# Patient Record
Sex: MALE | Race: WHITE | Employment: OTHER
[De-identification: names, ages, dates, MRNs, and addresses within clinical notes are randomized per-mention and may not be internally consistent; named-entity substitution may affect disease eponyms.]

---

## 2017-02-20 ENCOUNTER — RX ONLY (RX ONLY)
Age: 72
End: 2017-02-20

## 2017-02-20 ENCOUNTER — DOCTOR'S OFFICE (OUTPATIENT)
Dept: URBAN - NONMETROPOLITAN AREA CLINIC 1 | Facility: CLINIC | Age: 72
Setting detail: OPHTHALMOLOGY
End: 2017-02-20
Payer: COMMERCIAL

## 2017-02-20 DIAGNOSIS — H04.121: ICD-10-CM

## 2017-02-20 DIAGNOSIS — H35.373: ICD-10-CM

## 2017-02-20 DIAGNOSIS — H35.3132: ICD-10-CM

## 2017-02-20 DIAGNOSIS — H04.122: ICD-10-CM

## 2017-02-20 DIAGNOSIS — H34.8320: ICD-10-CM

## 2017-02-20 PROCEDURE — 83861 MICROFLUID ANALY TEARS: CPT | Performed by: OPHTHALMOLOGY

## 2017-02-20 PROCEDURE — 92014 COMPRE OPH EXAM EST PT 1/>: CPT | Performed by: OPHTHALMOLOGY

## 2017-02-20 PROCEDURE — 92134 CPTRZ OPH DX IMG PST SGM RTA: CPT | Performed by: OPHTHALMOLOGY

## 2017-02-20 ASSESSMENT — REFRACTION_MANIFEST
OS_VA3: 20/
OS_VA2: 20/
OU_VA: 20/
OS_VA2: 20/
OS_VA1: 20/
OD_VA3: 20/
OU_VA: 20/
OS_VA3: 20/
OS_VA1: 20/
OD_VA3: 20/
OD_VA1: 20/
OS_VA1: 20/
OS_VA3: 20/
OS_VA2: 20/
OD_VA3: 20/
OU_VA: 20/
OD_VA2: 20/
OD_VA1: 20/
OD_VA1: 20/

## 2017-02-20 ASSESSMENT — REFRACTION_CURRENTRX
OS_OVR_VA: 20/
OD_OVR_VA: 20/
OS_OVR_VA: 20/
OD_OVR_VA: 20/
OD_OVR_VA: 20/
OS_OVR_VA: 20/

## 2017-02-20 ASSESSMENT — SPHEQUIV_DERIVED
OS_SPHEQUIV: 0.125
OD_SPHEQUIV: 3.125

## 2017-02-20 ASSESSMENT — REFRACTION_AUTOREFRACTION
OS_CYLINDER: -2.25
OD_SPHERE: +4.00
OS_AXIS: 83
OD_CYLINDER: -1.75
OS_SPHERE: +1.25
OD_AXIS: 94

## 2017-02-20 ASSESSMENT — CONFRONTATIONAL VISUAL FIELD TEST (CVF)
OS_FINDINGS: FULL
OD_FINDINGS: FULL

## 2017-02-20 ASSESSMENT — VISUAL ACUITY
OS_BCVA: 20/20-2
OD_BCVA: 20/400

## 2017-02-23 ENCOUNTER — DOCTOR'S OFFICE (OUTPATIENT)
Dept: URBAN - NONMETROPOLITAN AREA CLINIC 1 | Facility: CLINIC | Age: 72
Setting detail: OPHTHALMOLOGY
End: 2017-02-23
Payer: COMMERCIAL

## 2017-02-23 DIAGNOSIS — H35.3132: ICD-10-CM

## 2017-02-23 DIAGNOSIS — H04.122: ICD-10-CM

## 2017-02-23 PROCEDURE — 67028 INJECTION EYE DRUG: CPT | Performed by: OPHTHALMOLOGY

## 2017-02-23 ASSESSMENT — REFRACTION_AUTOREFRACTION
OS_AXIS: 83
OD_AXIS: 94
OS_SPHERE: +1.25
OD_CYLINDER: -1.75
OS_CYLINDER: -2.25
OD_SPHERE: +4.00

## 2017-02-23 ASSESSMENT — VISUAL ACUITY
OD_BCVA: 20/400
OS_BCVA: 20/20-2

## 2017-02-23 ASSESSMENT — SPHEQUIV_DERIVED
OD_SPHEQUIV: 3.125
OS_SPHEQUIV: 0.125

## 2017-03-23 ENCOUNTER — DOCTOR'S OFFICE (OUTPATIENT)
Dept: URBAN - NONMETROPOLITAN AREA CLINIC 1 | Facility: CLINIC | Age: 72
Setting detail: OPHTHALMOLOGY
End: 2017-03-23
Payer: COMMERCIAL

## 2017-03-23 DIAGNOSIS — H04.122: ICD-10-CM

## 2017-03-23 DIAGNOSIS — H04.121: ICD-10-CM

## 2017-03-23 DIAGNOSIS — H34.8320: ICD-10-CM

## 2017-03-23 DIAGNOSIS — H35.3132: ICD-10-CM

## 2017-03-23 PROCEDURE — 92014 COMPRE OPH EXAM EST PT 1/>: CPT | Performed by: OPHTHALMOLOGY

## 2017-03-23 PROCEDURE — 92134 CPTRZ OPH DX IMG PST SGM RTA: CPT | Performed by: OPHTHALMOLOGY

## 2017-03-23 ASSESSMENT — REFRACTION_MANIFEST
OS_VA2: 20/
OS_VA1: 20/
OS_VA3: 20/
OS_VA2: 20/
OD_VA1: 20/
OS_VA1: 20/
OU_VA: 20/
OS_VA3: 20/
OD_VA1: 20/
OD_VA2: 20/
OD_VA2: 20/
OS_VA1: 20/
OS_VA3: 20/
OU_VA: 20/
OD_VA3: 20/
OD_VA3: 20/
OS_VA2: 20/
OD_VA3: 20/
OD_VA1: 20/
OU_VA: 20/
OD_VA2: 20/

## 2017-03-23 ASSESSMENT — REFRACTION_AUTOREFRACTION
OD_AXIS: 94
OD_CYLINDER: -1.75
OS_AXIS: 83
OS_SPHERE: +1.25
OS_CYLINDER: -2.25
OD_SPHERE: +4.00

## 2017-03-23 ASSESSMENT — CONFRONTATIONAL VISUAL FIELD TEST (CVF)
OD_FINDINGS: FULL
OS_FINDINGS: INFERONASAL DEFECT

## 2017-03-23 ASSESSMENT — REFRACTION_CURRENTRX
OD_OVR_VA: 20/
OS_OVR_VA: 20/
OS_OVR_VA: 20/
OD_OVR_VA: 20/
OS_OVR_VA: 20/
OD_OVR_VA: 20/

## 2017-03-23 ASSESSMENT — VISUAL ACUITY
OD_BCVA: 20/400
OS_BCVA: 20/25-2

## 2017-03-23 ASSESSMENT — SPHEQUIV_DERIVED
OS_SPHEQUIV: 0.125
OD_SPHEQUIV: 3.125

## 2017-03-31 ENCOUNTER — DOCTOR'S OFFICE (OUTPATIENT)
Dept: URBAN - NONMETROPOLITAN AREA CLINIC 1 | Facility: CLINIC | Age: 72
Setting detail: OPHTHALMOLOGY
End: 2017-03-31
Payer: COMMERCIAL

## 2017-03-31 DIAGNOSIS — H34.8320: ICD-10-CM

## 2017-03-31 DIAGNOSIS — H35.373: ICD-10-CM

## 2017-03-31 DIAGNOSIS — H35.3132: ICD-10-CM

## 2017-03-31 PROCEDURE — 92235 FLUORESCEIN ANGRPH MLTIFRAME: CPT | Performed by: OPHTHALMOLOGY

## 2017-03-31 PROCEDURE — 92250 FUNDUS PHOTOGRAPHY W/I&R: CPT | Performed by: OPHTHALMOLOGY

## 2017-03-31 PROCEDURE — 67028 INJECTION EYE DRUG: CPT | Performed by: OPHTHALMOLOGY

## 2017-03-31 ASSESSMENT — VISUAL ACUITY
OS_BCVA: 20/25-2
OD_BCVA: 20/400

## 2017-03-31 ASSESSMENT — REFRACTION_AUTOREFRACTION
OS_SPHERE: +1.25
OS_AXIS: 83
OS_CYLINDER: -2.25
OD_SPHERE: +4.00
OD_AXIS: 94
OD_CYLINDER: -1.75

## 2017-03-31 ASSESSMENT — SPHEQUIV_DERIVED
OS_SPHEQUIV: 0.125
OD_SPHEQUIV: 3.125

## 2017-04-06 ENCOUNTER — RX ONLY (RX ONLY)
Age: 72
End: 2017-04-06

## 2017-04-06 ENCOUNTER — DOCTOR'S OFFICE (OUTPATIENT)
Dept: URBAN - NONMETROPOLITAN AREA CLINIC 1 | Facility: CLINIC | Age: 72
Setting detail: OPHTHALMOLOGY
End: 2017-04-06
Payer: COMMERCIAL

## 2017-04-06 DIAGNOSIS — H34.8320: ICD-10-CM

## 2017-04-06 PROCEDURE — 67210 TREATMENT OF RETINAL LESION: CPT | Performed by: OPHTHALMOLOGY

## 2017-04-06 ASSESSMENT — SPHEQUIV_DERIVED
OD_SPHEQUIV: 3.125
OS_SPHEQUIV: 0.125

## 2017-04-06 ASSESSMENT — VISUAL ACUITY
OS_BCVA: 20/25-2
OD_BCVA: 20/400

## 2017-04-06 ASSESSMENT — REFRACTION_AUTOREFRACTION
OS_SPHERE: +1.25
OD_CYLINDER: -1.75
OS_AXIS: 83
OD_AXIS: 94
OD_SPHERE: +4.00
OS_CYLINDER: -2.25

## 2017-06-22 ENCOUNTER — DOCTOR'S OFFICE (OUTPATIENT)
Dept: URBAN - NONMETROPOLITAN AREA CLINIC 1 | Facility: CLINIC | Age: 72
Setting detail: OPHTHALMOLOGY
End: 2017-06-22
Payer: COMMERCIAL

## 2017-06-22 DIAGNOSIS — H35.3132: ICD-10-CM

## 2017-06-22 DIAGNOSIS — H34.8320: ICD-10-CM

## 2017-06-22 DIAGNOSIS — H35.373: ICD-10-CM

## 2017-06-22 DIAGNOSIS — H25.13: ICD-10-CM

## 2017-06-22 DIAGNOSIS — H04.122: ICD-10-CM

## 2017-06-22 DIAGNOSIS — H04.121: ICD-10-CM

## 2017-06-22 PROCEDURE — 99024 POSTOP FOLLOW-UP VISIT: CPT | Performed by: OPHTHALMOLOGY

## 2017-06-22 PROCEDURE — 92134 CPTRZ OPH DX IMG PST SGM RTA: CPT | Performed by: OPHTHALMOLOGY

## 2017-06-22 ASSESSMENT — REFRACTION_MANIFEST
OD_VA2: 20/
OD_VA1: 20/
OS_VA3: 20/
OD_VA2: 20/
OS_VA1: 20/
OS_VA1: 20/
OS_VA2: 20/
OD_VA3: 20/
OS_VA3: 20/
OD_VA2: 20/
OS_VA2: 20/
OD_VA1: 20/
OS_VA2: 20/
OD_VA3: 20/
OD_VA1: 20/
OS_VA3: 20/
OU_VA: 20/
OS_VA1: 20/
OD_VA3: 20/
OU_VA: 20/
OU_VA: 20/

## 2017-06-22 ASSESSMENT — REFRACTION_CURRENTRX
OS_OVR_VA: 20/
OD_OVR_VA: 20/
OS_OVR_VA: 20/
OS_OVR_VA: 20/

## 2017-06-22 ASSESSMENT — REFRACTION_AUTOREFRACTION
OD_AXIS: 94
OD_CYLINDER: -1.75
OS_CYLINDER: -2.25
OS_SPHERE: +1.25
OS_AXIS: 83
OD_SPHERE: +4.00

## 2017-06-22 ASSESSMENT — SPHEQUIV_DERIVED
OD_SPHEQUIV: 3.125
OS_SPHEQUIV: 0.125

## 2017-06-22 ASSESSMENT — CONFRONTATIONAL VISUAL FIELD TEST (CVF)
OS_FINDINGS: INFERONASAL DEFECT
OD_FINDINGS: FULL

## 2017-06-22 ASSESSMENT — VISUAL ACUITY
OS_BCVA: 20/25+1
OD_BCVA: 20/400

## 2017-07-17 ENCOUNTER — DOCTOR'S OFFICE (OUTPATIENT)
Dept: URBAN - NONMETROPOLITAN AREA CLINIC 1 | Facility: CLINIC | Age: 72
Setting detail: OPHTHALMOLOGY
End: 2017-07-17
Payer: COMMERCIAL

## 2017-07-17 ENCOUNTER — RX ONLY (RX ONLY)
Age: 72
End: 2017-07-17

## 2017-07-17 DIAGNOSIS — H35.373: ICD-10-CM

## 2017-07-17 DIAGNOSIS — H35.3132: ICD-10-CM

## 2017-07-17 DIAGNOSIS — H04.121: ICD-10-CM

## 2017-07-17 DIAGNOSIS — H04.122: ICD-10-CM

## 2017-07-17 DIAGNOSIS — H25.13: ICD-10-CM

## 2017-07-17 DIAGNOSIS — H34.8320: ICD-10-CM

## 2017-07-17 PROCEDURE — 67210 TREATMENT OF RETINAL LESION: CPT | Performed by: OPHTHALMOLOGY

## 2017-07-17 PROCEDURE — 92014 COMPRE OPH EXAM EST PT 1/>: CPT | Performed by: OPHTHALMOLOGY

## 2017-07-17 PROCEDURE — 92235 FLUORESCEIN ANGRPH MLTIFRAME: CPT | Performed by: OPHTHALMOLOGY

## 2017-07-17 PROCEDURE — 92250 FUNDUS PHOTOGRAPHY W/I&R: CPT | Performed by: OPHTHALMOLOGY

## 2017-07-17 ASSESSMENT — REFRACTION_AUTOREFRACTION
OS_CYLINDER: -2.25
OD_CYLINDER: -1.75
OD_AXIS: 94
OS_AXIS: 83
OD_SPHERE: +4.00
OS_SPHERE: +1.25

## 2017-07-17 ASSESSMENT — REFRACTION_MANIFEST
OD_VA2: 20/
OD_VA1: 20/
OD_VA2: 20/
OS_VA1: 20/
OD_VA3: 20/
OD_VA2: 20/
OS_VA3: 20/
OS_VA3: 20/
OS_VA1: 20/
OS_VA2: 20/
OU_VA: 20/
OS_VA1: 20/
OS_VA3: 20/
OD_VA1: 20/
OS_VA2: 20/
OD_VA3: 20/
OD_VA3: 20/
OS_VA2: 20/
OU_VA: 20/
OD_VA1: 20/
OU_VA: 20/

## 2017-07-17 ASSESSMENT — CONFRONTATIONAL VISUAL FIELD TEST (CVF)
OS_FINDINGS: INFERONASAL DEFECT
OD_FINDINGS: FULL

## 2017-07-17 ASSESSMENT — REFRACTION_CURRENTRX
OS_OVR_VA: 20/
OD_OVR_VA: 20/
OS_OVR_VA: 20/
OS_OVR_VA: 20/
OD_OVR_VA: 20/
OD_OVR_VA: 20/

## 2017-07-17 ASSESSMENT — SPHEQUIV_DERIVED
OD_SPHEQUIV: 3.125
OS_SPHEQUIV: 0.125

## 2017-07-17 ASSESSMENT — VISUAL ACUITY
OD_BCVA: 20/400
OS_BCVA: 20/20

## 2017-07-24 ENCOUNTER — DOCTOR'S OFFICE (OUTPATIENT)
Dept: URBAN - NONMETROPOLITAN AREA CLINIC 1 | Facility: CLINIC | Age: 72
Setting detail: OPHTHALMOLOGY
End: 2017-07-24
Payer: COMMERCIAL

## 2017-07-24 DIAGNOSIS — H34.8320: ICD-10-CM

## 2017-07-24 PROCEDURE — 67028 INJECTION EYE DRUG: CPT | Performed by: OPHTHALMOLOGY

## 2017-07-24 PROCEDURE — 99024 POSTOP FOLLOW-UP VISIT: CPT | Performed by: OPHTHALMOLOGY

## 2017-07-24 ASSESSMENT — SPHEQUIV_DERIVED
OD_SPHEQUIV: 3.125
OS_SPHEQUIV: 0.125

## 2017-07-24 ASSESSMENT — REFRACTION_AUTOREFRACTION
OD_AXIS: 94
OS_SPHERE: +1.25
OS_AXIS: 83
OD_SPHERE: +4.00
OS_CYLINDER: -2.25
OD_CYLINDER: -1.75

## 2017-07-24 ASSESSMENT — VISUAL ACUITY
OS_BCVA: 20/20
OD_BCVA: 20/400

## 2017-09-25 ENCOUNTER — DOCTOR'S OFFICE (OUTPATIENT)
Dept: URBAN - NONMETROPOLITAN AREA CLINIC 1 | Facility: CLINIC | Age: 72
Setting detail: OPHTHALMOLOGY
End: 2017-09-25
Payer: COMMERCIAL

## 2017-09-25 DIAGNOSIS — H35.3132: ICD-10-CM

## 2017-09-25 DIAGNOSIS — H25.13: ICD-10-CM

## 2017-09-25 DIAGNOSIS — H04.121: ICD-10-CM

## 2017-09-25 DIAGNOSIS — H04.122: ICD-10-CM

## 2017-09-25 DIAGNOSIS — H34.8320: ICD-10-CM

## 2017-09-25 DIAGNOSIS — H35.373: ICD-10-CM

## 2017-09-25 PROCEDURE — 99024 POSTOP FOLLOW-UP VISIT: CPT | Performed by: OPHTHALMOLOGY

## 2017-09-25 PROCEDURE — 92134 CPTRZ OPH DX IMG PST SGM RTA: CPT | Performed by: OPHTHALMOLOGY

## 2017-09-25 ASSESSMENT — REFRACTION_MANIFEST
OD_VA3: 20/
OS_VA2: 20/
OD_VA2: 20/
OU_VA: 20/
OD_VA2: 20/
OU_VA: 20/
OS_VA2: 20/
OS_VA1: 20/
OU_VA: 20/
OD_VA3: 20/
OS_VA1: 20/
OS_VA3: 20/
OS_VA3: 20/
OD_VA3: 20/
OS_VA3: 20/
OD_VA1: 20/
OS_VA1: 20/
OD_VA1: 20/
OS_VA2: 20/
OD_VA2: 20/
OD_VA1: 20/

## 2017-09-25 ASSESSMENT — REFRACTION_CURRENTRX
OS_OVR_VA: 20/
OD_OVR_VA: 20/

## 2017-09-25 ASSESSMENT — REFRACTION_AUTOREFRACTION
OD_SPHERE: +4.00
OS_CYLINDER: -2.25
OD_AXIS: 94
OS_SPHERE: +1.25
OS_AXIS: 83
OD_CYLINDER: -1.75

## 2017-09-25 ASSESSMENT — CONFRONTATIONAL VISUAL FIELD TEST (CVF)
OD_FINDINGS: FULL
OS_FINDINGS: FULL

## 2017-09-25 ASSESSMENT — VISUAL ACUITY
OD_BCVA: 20/400
OS_BCVA: 20/20-2

## 2017-09-25 ASSESSMENT — SPHEQUIV_DERIVED
OS_SPHEQUIV: 0.125
OD_SPHEQUIV: 3.125

## 2017-10-06 ENCOUNTER — DOCTOR'S OFFICE (OUTPATIENT)
Dept: URBAN - NONMETROPOLITAN AREA CLINIC 1 | Facility: CLINIC | Age: 72
Setting detail: OPHTHALMOLOGY
End: 2017-10-06
Payer: COMMERCIAL

## 2017-10-06 DIAGNOSIS — H35.3132: ICD-10-CM

## 2017-10-06 DIAGNOSIS — H34.8320: ICD-10-CM

## 2017-10-06 PROCEDURE — 67028 INJECTION EYE DRUG: CPT | Performed by: OPHTHALMOLOGY

## 2017-10-06 ASSESSMENT — REFRACTION_AUTOREFRACTION
OS_AXIS: 83
OS_SPHERE: +1.25
OD_CYLINDER: -1.75
OD_SPHERE: +4.00
OS_CYLINDER: -2.25
OD_AXIS: 94

## 2017-10-06 ASSESSMENT — SPHEQUIV_DERIVED
OS_SPHEQUIV: 0.125
OD_SPHEQUIV: 3.125

## 2017-10-06 ASSESSMENT — VISUAL ACUITY
OS_BCVA: 20/20-2
OD_BCVA: 20/400

## 2017-10-12 ENCOUNTER — DOCTOR'S OFFICE (OUTPATIENT)
Dept: URBAN - NONMETROPOLITAN AREA CLINIC 1 | Facility: CLINIC | Age: 72
Setting detail: OPHTHALMOLOGY
End: 2017-10-12
Payer: COMMERCIAL

## 2017-10-12 DIAGNOSIS — H35.3132: ICD-10-CM

## 2017-10-12 DIAGNOSIS — H34.8320: ICD-10-CM

## 2017-10-12 DIAGNOSIS — H00.14: ICD-10-CM

## 2017-10-12 PROCEDURE — NO CHARGE N/C PROFESSIONAL COURTESY: Performed by: OPHTHALMOLOGY

## 2017-10-12 ASSESSMENT — REFRACTION_MANIFEST
OS_VA1: 20/
OD_VA1: 20/
OU_VA: 20/
OS_VA1: 20/
OD_VA1: 20/
OS_VA2: 20/
OD_VA2: 20/
OS_VA3: 20/
OU_VA: 20/
OS_VA1: 20/
OS_VA3: 20/
OS_VA3: 20/
OD_VA2: 20/
OD_VA3: 20/
OU_VA: 20/
OS_VA2: 20/
OD_VA2: 20/
OD_VA3: 20/
OD_VA1: 20/
OS_VA2: 20/
OD_VA3: 20/

## 2017-10-12 ASSESSMENT — REFRACTION_CURRENTRX
OD_OVR_VA: 20/
OS_OVR_VA: 20/
OD_OVR_VA: 20/
OS_OVR_VA: 20/
OD_OVR_VA: 20/
OS_OVR_VA: 20/

## 2017-10-12 ASSESSMENT — REFRACTION_AUTOREFRACTION
OD_SPHERE: +4.00
OD_CYLINDER: -1.75
OS_AXIS: 83
OS_SPHERE: +1.25
OS_CYLINDER: -2.25
OD_AXIS: 94

## 2017-10-12 ASSESSMENT — SPHEQUIV_DERIVED
OD_SPHEQUIV: 3.125
OS_SPHEQUIV: 0.125

## 2017-10-12 ASSESSMENT — VISUAL ACUITY
OS_BCVA: 20/20-2
OD_BCVA: 20/400

## 2017-10-16 ENCOUNTER — DOCTOR'S OFFICE (OUTPATIENT)
Dept: URBAN - NONMETROPOLITAN AREA CLINIC 1 | Facility: CLINIC | Age: 72
Setting detail: OPHTHALMOLOGY
End: 2017-10-16
Payer: COMMERCIAL

## 2017-10-16 ENCOUNTER — RX ONLY (RX ONLY)
Age: 72
End: 2017-10-16

## 2017-10-16 DIAGNOSIS — H34.8320: ICD-10-CM

## 2017-10-16 PROCEDURE — 67028 INJECTION EYE DRUG: CPT | Performed by: OPHTHALMOLOGY

## 2017-10-16 ASSESSMENT — SPHEQUIV_DERIVED
OD_SPHEQUIV: 3.125
OS_SPHEQUIV: 0.125

## 2017-10-16 ASSESSMENT — REFRACTION_AUTOREFRACTION
OS_CYLINDER: -2.25
OD_CYLINDER: -1.75
OD_SPHERE: +4.00
OS_SPHERE: +1.25
OS_AXIS: 83
OD_AXIS: 94

## 2017-10-16 ASSESSMENT — VISUAL ACUITY
OD_BCVA: 20/400
OS_BCVA: 20/20-2

## 2017-10-20 ENCOUNTER — DOCTOR'S OFFICE (OUTPATIENT)
Dept: URBAN - NONMETROPOLITAN AREA CLINIC 1 | Facility: CLINIC | Age: 72
Setting detail: OPHTHALMOLOGY
End: 2017-10-20
Payer: COMMERCIAL

## 2017-10-20 DIAGNOSIS — H34.8320: ICD-10-CM

## 2017-10-20 PROCEDURE — 67210 TREATMENT OF RETINAL LESION: CPT | Performed by: OPHTHALMOLOGY

## 2017-10-20 ASSESSMENT — VISUAL ACUITY
OD_BCVA: 20/400
OS_BCVA: 20/20-2

## 2017-10-20 ASSESSMENT — SPHEQUIV_DERIVED
OD_SPHEQUIV: 3.125
OS_SPHEQUIV: 0.125

## 2017-10-20 ASSESSMENT — REFRACTION_AUTOREFRACTION
OS_AXIS: 83
OD_SPHERE: +4.00
OD_AXIS: 94
OS_CYLINDER: -2.25
OS_SPHERE: +1.25
OD_CYLINDER: -1.75

## 2017-11-13 ENCOUNTER — DOCTOR'S OFFICE (OUTPATIENT)
Dept: URBAN - NONMETROPOLITAN AREA CLINIC 1 | Facility: CLINIC | Age: 72
Setting detail: OPHTHALMOLOGY
End: 2017-11-13
Payer: COMMERCIAL

## 2017-11-13 DIAGNOSIS — H34.8320: ICD-10-CM

## 2017-11-13 DIAGNOSIS — H25.13: ICD-10-CM

## 2017-11-13 DIAGNOSIS — H04.123: ICD-10-CM

## 2017-11-13 DIAGNOSIS — H35.3132: ICD-10-CM

## 2017-11-13 DIAGNOSIS — H35.373: ICD-10-CM

## 2017-11-13 DIAGNOSIS — H00.14: ICD-10-CM

## 2017-11-13 PROCEDURE — 67028 INJECTION EYE DRUG: CPT | Performed by: OPHTHALMOLOGY

## 2017-11-13 PROCEDURE — 92134 CPTRZ OPH DX IMG PST SGM RTA: CPT | Performed by: OPHTHALMOLOGY

## 2017-11-13 PROCEDURE — 92014 COMPRE OPH EXAM EST PT 1/>: CPT | Performed by: OPHTHALMOLOGY

## 2017-11-13 ASSESSMENT — SPHEQUIV_DERIVED
OD_SPHEQUIV: 3.125
OS_SPHEQUIV: 0.125

## 2017-11-13 ASSESSMENT — VISUAL ACUITY
OD_BCVA: 20/400
OS_BCVA: 20/25-2

## 2017-11-13 ASSESSMENT — REFRACTION_MANIFEST
OS_VA2: 20/
OD_VA3: 20/
OS_VA2: 20/
OD_VA1: 20/
OD_VA2: 20/
OD_VA2: 20/
OS_VA2: 20/
OS_VA1: 20/
OD_VA3: 20/
OD_VA1: 20/
OS_VA1: 20/
OU_VA: 20/
OS_VA3: 20/
OU_VA: 20/
OS_VA1: 20/
OS_VA3: 20/
OD_VA1: 20/
OU_VA: 20/
OD_VA3: 20/
OS_VA3: 20/
OD_VA2: 20/

## 2017-11-13 ASSESSMENT — REFRACTION_CURRENTRX
OD_OVR_VA: 20/
OS_OVR_VA: 20/
OD_OVR_VA: 20/
OD_OVR_VA: 20/
OS_OVR_VA: 20/
OS_OVR_VA: 20/

## 2017-11-13 ASSESSMENT — REFRACTION_AUTOREFRACTION
OS_CYLINDER: -2.25
OD_AXIS: 94
OD_CYLINDER: -1.75
OS_AXIS: 83
OD_SPHERE: +4.00
OS_SPHERE: +1.25

## 2017-11-13 ASSESSMENT — CONFRONTATIONAL VISUAL FIELD TEST (CVF)
OS_FINDINGS: FULL
OD_FINDINGS: FULL

## 2017-12-01 ENCOUNTER — DOCTOR'S OFFICE (OUTPATIENT)
Dept: URBAN - NONMETROPOLITAN AREA CLINIC 1 | Facility: CLINIC | Age: 72
Setting detail: OPHTHALMOLOGY
End: 2017-12-01
Payer: COMMERCIAL

## 2017-12-01 DIAGNOSIS — H34.8320: ICD-10-CM

## 2017-12-01 PROCEDURE — 99024 POSTOP FOLLOW-UP VISIT: CPT | Performed by: OPHTHALMOLOGY

## 2017-12-01 PROCEDURE — 67228 TREATMENT X10SV RETINOPATHY: CPT | Performed by: OPHTHALMOLOGY

## 2017-12-01 ASSESSMENT — REFRACTION_AUTOREFRACTION
OD_AXIS: 94
OD_CYLINDER: -1.75
OS_AXIS: 83
OS_SPHERE: +1.25
OS_CYLINDER: -2.25
OD_SPHERE: +4.00

## 2017-12-01 ASSESSMENT — VISUAL ACUITY
OD_BCVA: 20/400
OS_BCVA: 20/25-2

## 2017-12-01 ASSESSMENT — SPHEQUIV_DERIVED
OD_SPHEQUIV: 3.125
OS_SPHEQUIV: 0.125

## 2017-12-15 ENCOUNTER — DOCTOR'S OFFICE (OUTPATIENT)
Dept: URBAN - NONMETROPOLITAN AREA CLINIC 1 | Facility: CLINIC | Age: 72
Setting detail: OPHTHALMOLOGY
End: 2017-12-15
Payer: COMMERCIAL

## 2017-12-15 DIAGNOSIS — H34.8320: ICD-10-CM

## 2017-12-15 PROCEDURE — 92235 FLUORESCEIN ANGRPH MLTIFRAME: CPT | Performed by: OPHTHALMOLOGY

## 2017-12-15 PROCEDURE — 67028 INJECTION EYE DRUG: CPT | Performed by: OPHTHALMOLOGY

## 2017-12-15 PROCEDURE — 99024 POSTOP FOLLOW-UP VISIT: CPT | Performed by: OPHTHALMOLOGY

## 2017-12-15 PROCEDURE — 92250 FUNDUS PHOTOGRAPHY W/I&R: CPT | Performed by: OPHTHALMOLOGY

## 2017-12-15 ASSESSMENT — SPHEQUIV_DERIVED
OD_SPHEQUIV: 3.125
OS_SPHEQUIV: 0.125

## 2017-12-15 ASSESSMENT — REFRACTION_AUTOREFRACTION
OS_SPHERE: +1.25
OS_CYLINDER: -2.25
OD_CYLINDER: -1.75
OS_AXIS: 83
OD_AXIS: 94
OD_SPHERE: +4.00

## 2017-12-15 ASSESSMENT — VISUAL ACUITY
OD_BCVA: 20/400
OS_BCVA: 20/25-2

## 2018-03-12 ENCOUNTER — DOCTOR'S OFFICE (OUTPATIENT)
Dept: URBAN - NONMETROPOLITAN AREA CLINIC 1 | Facility: CLINIC | Age: 73
Setting detail: OPHTHALMOLOGY
End: 2018-03-12
Payer: COMMERCIAL

## 2018-03-12 DIAGNOSIS — H35.3132: ICD-10-CM

## 2018-03-12 DIAGNOSIS — H25.13: ICD-10-CM

## 2018-03-12 DIAGNOSIS — H35.373: ICD-10-CM

## 2018-03-12 DIAGNOSIS — H43.813: ICD-10-CM

## 2018-03-12 DIAGNOSIS — H43.812: ICD-10-CM

## 2018-03-12 DIAGNOSIS — H04.123: ICD-10-CM

## 2018-03-12 DIAGNOSIS — H43.811: ICD-10-CM

## 2018-03-12 PROCEDURE — 92134 CPTRZ OPH DX IMG PST SGM RTA: CPT | Performed by: OPHTHALMOLOGY

## 2018-03-12 PROCEDURE — 92014 COMPRE OPH EXAM EST PT 1/>: CPT | Performed by: OPHTHALMOLOGY

## 2018-03-12 PROCEDURE — 92225 OPHTHALMOSCOPY EXTENDED INITIAL: CPT | Performed by: OPHTHALMOLOGY

## 2018-03-12 ASSESSMENT — REFRACTION_MANIFEST
OS_VA1: 20/
OD_VA2: 20/
OS_VA3: 20/
OS_VA3: 20/
OS_VA2: 20/
OU_VA: 20/
OD_VA3: 20/
OD_VA3: 20/
OU_VA: 20/
OS_VA2: 20/
OD_VA2: 20/
OD_VA1: 20/
OD_VA1: 20/
OD_VA3: 20/
OS_VA2: 20/
OS_VA1: 20/
OU_VA: 20/
OS_VA1: 20/
OD_VA2: 20/
OD_VA1: 20/
OS_VA3: 20/

## 2018-03-12 ASSESSMENT — SPHEQUIV_DERIVED
OS_SPHEQUIV: 0.125
OD_SPHEQUIV: 3.125

## 2018-03-12 ASSESSMENT — REFRACTION_AUTOREFRACTION
OD_AXIS: 94
OD_SPHERE: +4.00
OD_CYLINDER: -1.75
OS_AXIS: 83
OS_SPHERE: +1.25
OS_CYLINDER: -2.25

## 2018-03-12 ASSESSMENT — REFRACTION_CURRENTRX
OS_OVR_VA: 20/
OS_OVR_VA: 20/
OD_OVR_VA: 20/
OD_OVR_VA: 20/
OS_OVR_VA: 20/
OD_OVR_VA: 20/

## 2018-03-12 ASSESSMENT — CONFRONTATIONAL VISUAL FIELD TEST (CVF)
OD_FINDINGS: FULL
OS_FINDINGS: FULL

## 2018-03-12 ASSESSMENT — VISUAL ACUITY
OD_BCVA: CF 5FT
OS_BCVA: 20/25-2

## 2018-03-22 ENCOUNTER — DOCTOR'S OFFICE (OUTPATIENT)
Dept: URBAN - NONMETROPOLITAN AREA CLINIC 1 | Facility: CLINIC | Age: 73
Setting detail: OPHTHALMOLOGY
End: 2018-03-22
Payer: COMMERCIAL

## 2018-03-22 DIAGNOSIS — H34.8120: ICD-10-CM

## 2018-03-22 PROCEDURE — 67028 INJECTION EYE DRUG: CPT | Performed by: OPHTHALMOLOGY

## 2018-03-22 ASSESSMENT — REFRACTION_AUTOREFRACTION
OD_CYLINDER: -1.75
OS_SPHERE: +1.25
OD_SPHERE: +4.00
OS_AXIS: 83
OS_CYLINDER: -2.25
OD_AXIS: 94

## 2018-03-22 ASSESSMENT — SPHEQUIV_DERIVED
OS_SPHEQUIV: 0.125
OD_SPHEQUIV: 3.125

## 2018-03-22 ASSESSMENT — VISUAL ACUITY
OS_BCVA: 20/25-2
OD_BCVA: CF 5FT

## 2018-03-29 ENCOUNTER — DOCTOR'S OFFICE (OUTPATIENT)
Dept: URBAN - NONMETROPOLITAN AREA CLINIC 1 | Facility: CLINIC | Age: 73
Setting detail: OPHTHALMOLOGY
End: 2018-03-29
Payer: COMMERCIAL

## 2018-03-29 DIAGNOSIS — H34.8120: ICD-10-CM

## 2018-03-29 PROCEDURE — 67210 TREATMENT OF RETINAL LESION: CPT | Performed by: OPHTHALMOLOGY

## 2018-03-29 ASSESSMENT — VISUAL ACUITY
OS_BCVA: 20/25-2
OD_BCVA: CF 5FT

## 2018-03-29 ASSESSMENT — REFRACTION_AUTOREFRACTION
OD_SPHERE: +4.00
OS_CYLINDER: -2.25
OD_AXIS: 94
OS_AXIS: 83
OD_CYLINDER: -1.75
OS_SPHERE: +1.25

## 2018-03-29 ASSESSMENT — SPHEQUIV_DERIVED
OS_SPHEQUIV: 0.125
OD_SPHEQUIV: 3.125

## 2018-04-16 ENCOUNTER — DOCTOR'S OFFICE (OUTPATIENT)
Dept: URBAN - NONMETROPOLITAN AREA CLINIC 1 | Facility: CLINIC | Age: 73
Setting detail: OPHTHALMOLOGY
End: 2018-04-16
Payer: COMMERCIAL

## 2018-04-16 DIAGNOSIS — H43.812: ICD-10-CM

## 2018-04-16 DIAGNOSIS — H35.373: ICD-10-CM

## 2018-04-16 DIAGNOSIS — H25.13: ICD-10-CM

## 2018-04-16 DIAGNOSIS — H43.811: ICD-10-CM

## 2018-04-16 DIAGNOSIS — H04.121: ICD-10-CM

## 2018-04-16 DIAGNOSIS — H35.3132: ICD-10-CM

## 2018-04-16 DIAGNOSIS — H34.8120: ICD-10-CM

## 2018-04-16 DIAGNOSIS — H40.053: ICD-10-CM

## 2018-04-16 DIAGNOSIS — H04.122: ICD-10-CM

## 2018-04-16 PROCEDURE — 99024 POSTOP FOLLOW-UP VISIT: CPT | Performed by: OPHTHALMOLOGY

## 2018-04-16 PROCEDURE — 92226 OPHTHALMOSCOPY EXT SUBSEQUENT: CPT | Performed by: OPHTHALMOLOGY

## 2018-04-16 PROCEDURE — 92134 CPTRZ OPH DX IMG PST SGM RTA: CPT | Performed by: OPHTHALMOLOGY

## 2018-04-16 ASSESSMENT — REFRACTION_CURRENTRX
OS_OVR_VA: 20/
OS_OVR_VA: 20/
OD_OVR_VA: 20/
OS_OVR_VA: 20/
OD_OVR_VA: 20/
OD_OVR_VA: 20/

## 2018-04-16 ASSESSMENT — REFRACTION_AUTOREFRACTION
OS_CYLINDER: -2.25
OD_CYLINDER: -1.75
OD_AXIS: 94
OD_SPHERE: +4.00
OS_AXIS: 83
OS_SPHERE: +1.25

## 2018-04-16 ASSESSMENT — REFRACTION_MANIFEST
OD_VA3: 20/
OS_VA3: 20/
OD_VA1: 20/
OD_VA2: 20/
OS_VA2: 20/
OD_VA1: 20/
OU_VA: 20/
OD_VA2: 20/
OS_VA3: 20/
OU_VA: 20/
OD_VA2: 20/
OU_VA: 20/
OS_VA2: 20/
OS_VA1: 20/
OS_VA1: 20/
OS_VA2: 20/
OD_VA3: 20/
OS_VA3: 20/
OD_VA1: 20/
OD_VA3: 20/
OS_VA1: 20/

## 2018-04-16 ASSESSMENT — VISUAL ACUITY
OS_BCVA: 20/20-1
OD_BCVA: CF 5FT

## 2018-04-16 ASSESSMENT — SPHEQUIV_DERIVED
OD_SPHEQUIV: 3.125
OS_SPHEQUIV: 0.125

## 2018-04-16 ASSESSMENT — CONFRONTATIONAL VISUAL FIELD TEST (CVF)
OD_FINDINGS: FULL
OS_FINDINGS: FULL

## 2018-04-26 ENCOUNTER — DOCTOR'S OFFICE (OUTPATIENT)
Dept: URBAN - NONMETROPOLITAN AREA CLINIC 1 | Facility: CLINIC | Age: 73
Setting detail: OPHTHALMOLOGY
End: 2018-04-26
Payer: COMMERCIAL

## 2018-04-26 DIAGNOSIS — H34.8120: ICD-10-CM

## 2018-04-26 PROCEDURE — 92250 FUNDUS PHOTOGRAPHY W/I&R: CPT | Performed by: OPHTHALMOLOGY

## 2018-04-26 PROCEDURE — 67028 INJECTION EYE DRUG: CPT | Performed by: OPHTHALMOLOGY

## 2018-04-26 PROCEDURE — 99024 POSTOP FOLLOW-UP VISIT: CPT | Performed by: OPHTHALMOLOGY

## 2018-04-26 PROCEDURE — 92235 FLUORESCEIN ANGRPH MLTIFRAME: CPT | Performed by: OPHTHALMOLOGY

## 2018-04-26 ASSESSMENT — VISUAL ACUITY
OS_BCVA: 20/20-1
OD_BCVA: CF 5FT

## 2018-04-26 ASSESSMENT — REFRACTION_MANIFEST
OU_VA: 20/
OS_VA3: 20/
OS_VA1: 20/
OS_VA2: 20/
OS_VA1: 20/
OD_VA1: 20/
OD_VA3: 20/
OU_VA: 20/
OS_VA3: 20/
OD_VA3: 20/
OD_VA2: 20/
OS_VA3: 20/
OD_VA1: 20/
OD_VA1: 20/
OU_VA: 20/
OS_VA2: 20/
OD_VA3: 20/
OS_VA2: 20/
OD_VA2: 20/
OD_VA2: 20/
OS_VA1: 20/

## 2018-04-26 ASSESSMENT — REFRACTION_AUTOREFRACTION
OD_CYLINDER: -1.75
OD_SPHERE: +4.00
OS_CYLINDER: -2.25
OD_AXIS: 94
OS_SPHERE: +1.25
OS_AXIS: 83

## 2018-04-26 ASSESSMENT — REFRACTION_CURRENTRX
OD_OVR_VA: 20/
OD_OVR_VA: 20/
OS_OVR_VA: 20/
OS_OVR_VA: 20/
OD_OVR_VA: 20/
OS_OVR_VA: 20/

## 2018-04-26 ASSESSMENT — SPHEQUIV_DERIVED
OS_SPHEQUIV: 0.125
OD_SPHEQUIV: 3.125

## 2018-06-01 ENCOUNTER — DOCTOR'S OFFICE (OUTPATIENT)
Dept: URBAN - NONMETROPOLITAN AREA CLINIC 1 | Facility: CLINIC | Age: 73
Setting detail: OPHTHALMOLOGY
End: 2018-06-01
Payer: COMMERCIAL

## 2018-06-01 DIAGNOSIS — H25.13: ICD-10-CM

## 2018-06-01 DIAGNOSIS — H40.053: ICD-10-CM

## 2018-06-01 DIAGNOSIS — H35.3132: ICD-10-CM

## 2018-06-01 DIAGNOSIS — H43.811: ICD-10-CM

## 2018-06-01 DIAGNOSIS — H43.812: ICD-10-CM

## 2018-06-01 DIAGNOSIS — H35.373: ICD-10-CM

## 2018-06-01 DIAGNOSIS — H34.8120: ICD-10-CM

## 2018-06-01 DIAGNOSIS — H04.121: ICD-10-CM

## 2018-06-01 DIAGNOSIS — H04.122: ICD-10-CM

## 2018-06-01 PROCEDURE — 99024 POSTOP FOLLOW-UP VISIT: CPT | Performed by: OPHTHALMOLOGY

## 2018-06-01 PROCEDURE — 92134 CPTRZ OPH DX IMG PST SGM RTA: CPT | Performed by: OPHTHALMOLOGY

## 2018-06-01 PROCEDURE — 92226 OPHTHALMOSCOPY EXT SUBSEQUENT: CPT | Performed by: OPHTHALMOLOGY

## 2018-06-01 ASSESSMENT — CONFRONTATIONAL VISUAL FIELD TEST (CVF)
OS_FINDINGS: FULL
OD_FINDINGS: FULL

## 2018-06-01 ASSESSMENT — REFRACTION_MANIFEST
OD_VA1: 20/
OS_VA2: 20/
OD_VA1: 20/
OS_VA3: 20/
OD_VA3: 20/
OD_VA2: 20/
OU_VA: 20/
OS_VA1: 20/
OD_VA3: 20/
OD_VA1: 20/
OD_VA2: 20/
OS_VA1: 20/
OU_VA: 20/
OD_VA3: 20/
OD_VA2: 20/
OS_VA2: 20/
OS_VA3: 20/
OS_VA1: 20/
OS_VA3: 20/
OS_VA2: 20/
OU_VA: 20/

## 2018-06-01 ASSESSMENT — SPHEQUIV_DERIVED
OD_SPHEQUIV: 3.125
OS_SPHEQUIV: 0.125

## 2018-06-01 ASSESSMENT — REFRACTION_AUTOREFRACTION
OD_CYLINDER: -1.75
OS_CYLINDER: -2.25
OS_SPHERE: +1.25
OD_AXIS: 94
OS_AXIS: 83
OD_SPHERE: +4.00

## 2018-06-01 ASSESSMENT — VISUAL ACUITY
OS_BCVA: 20/25-2
OD_BCVA: CF 5FT

## 2018-06-01 ASSESSMENT — REFRACTION_CURRENTRX
OS_OVR_VA: 20/
OD_OVR_VA: 20/
OS_OVR_VA: 20/
OS_OVR_VA: 20/
OD_OVR_VA: 20/
OD_OVR_VA: 20/

## 2018-06-07 ENCOUNTER — DOCTOR'S OFFICE (OUTPATIENT)
Dept: URBAN - NONMETROPOLITAN AREA CLINIC 1 | Facility: CLINIC | Age: 73
Setting detail: OPHTHALMOLOGY
End: 2018-06-07
Payer: COMMERCIAL

## 2018-06-07 DIAGNOSIS — H43.812: ICD-10-CM

## 2018-06-07 DIAGNOSIS — H43.811: ICD-10-CM

## 2018-06-07 DIAGNOSIS — H04.121: ICD-10-CM

## 2018-06-07 DIAGNOSIS — H34.8120: ICD-10-CM

## 2018-06-07 DIAGNOSIS — H35.3132: ICD-10-CM

## 2018-06-07 DIAGNOSIS — H35.373: ICD-10-CM

## 2018-06-07 DIAGNOSIS — H04.122: ICD-10-CM

## 2018-06-07 DIAGNOSIS — H25.13: ICD-10-CM

## 2018-06-07 DIAGNOSIS — H40.053: ICD-10-CM

## 2018-06-07 PROCEDURE — 99024 POSTOP FOLLOW-UP VISIT: CPT | Performed by: OPHTHALMOLOGY

## 2018-06-07 PROCEDURE — 67028 INJECTION EYE DRUG: CPT | Performed by: OPHTHALMOLOGY

## 2018-06-07 ASSESSMENT — REFRACTION_AUTOREFRACTION
OS_AXIS: 83
OD_SPHERE: +4.00
OD_CYLINDER: -1.75
OD_AXIS: 94
OS_CYLINDER: -2.25
OS_SPHERE: +1.25

## 2018-06-07 ASSESSMENT — SPHEQUIV_DERIVED
OD_SPHEQUIV: 3.125
OS_SPHEQUIV: 0.125

## 2018-06-07 ASSESSMENT — VISUAL ACUITY
OD_BCVA: CF 5FT
OS_BCVA: 20/25-2

## 2018-07-12 ENCOUNTER — RX ONLY (RX ONLY)
Age: 73
End: 2018-07-12

## 2018-07-12 ENCOUNTER — DOCTOR'S OFFICE (OUTPATIENT)
Dept: URBAN - NONMETROPOLITAN AREA CLINIC 1 | Facility: CLINIC | Age: 73
Setting detail: OPHTHALMOLOGY
End: 2018-07-12
Payer: COMMERCIAL

## 2018-07-12 DIAGNOSIS — H43.813: ICD-10-CM

## 2018-07-12 DIAGNOSIS — H25.13: ICD-10-CM

## 2018-07-12 DIAGNOSIS — H04.122: ICD-10-CM

## 2018-07-12 DIAGNOSIS — H35.3132: ICD-10-CM

## 2018-07-12 DIAGNOSIS — H40.053: ICD-10-CM

## 2018-07-12 DIAGNOSIS — H04.123: ICD-10-CM

## 2018-07-12 DIAGNOSIS — H35.373: ICD-10-CM

## 2018-07-12 DIAGNOSIS — H34.8120: ICD-10-CM

## 2018-07-12 PROCEDURE — 92134 CPTRZ OPH DX IMG PST SGM RTA: CPT | Performed by: OPHTHALMOLOGY

## 2018-07-12 PROCEDURE — 92014 COMPRE OPH EXAM EST PT 1/>: CPT | Performed by: OPHTHALMOLOGY

## 2018-07-12 PROCEDURE — 92235 FLUORESCEIN ANGRPH MLTIFRAME: CPT | Performed by: OPHTHALMOLOGY

## 2018-07-12 PROCEDURE — 67210 TREATMENT OF RETINAL LESION: CPT | Performed by: OPHTHALMOLOGY

## 2018-07-12 PROCEDURE — 92250 FUNDUS PHOTOGRAPHY W/I&R: CPT | Performed by: OPHTHALMOLOGY

## 2018-07-12 ASSESSMENT — REFRACTION_AUTOREFRACTION
OS_SPHERE: +1.25
OD_SPHERE: +4.00
OS_AXIS: 83
OD_AXIS: 94
OS_CYLINDER: -2.25
OD_CYLINDER: -1.75

## 2018-07-12 ASSESSMENT — REFRACTION_CURRENTRX
OS_OVR_VA: 20/
OD_OVR_VA: 20/
OS_OVR_VA: 20/
OD_OVR_VA: 20/
OD_OVR_VA: 20/
OS_OVR_VA: 20/

## 2018-07-12 ASSESSMENT — REFRACTION_MANIFEST
OD_VA3: 20/
OU_VA: 20/
OS_VA1: 20/
OD_VA3: 20/
OS_VA1: 20/
OS_VA2: 20/
OD_VA1: 20/
OS_VA3: 20/
OU_VA: 20/
OS_VA3: 20/
OD_VA2: 20/
OS_VA3: 20/
OD_VA2: 20/
OD_VA2: 20/
OS_VA2: 20/
OS_VA1: 20/
OU_VA: 20/
OD_VA1: 20/
OS_VA2: 20/
OD_VA3: 20/
OD_VA1: 20/

## 2018-07-12 ASSESSMENT — CONFRONTATIONAL VISUAL FIELD TEST (CVF)
OD_FINDINGS: FULL
OS_FINDINGS: FULL

## 2018-07-12 ASSESSMENT — SPHEQUIV_DERIVED
OS_SPHEQUIV: 0.125
OD_SPHEQUIV: 3.125

## 2018-07-12 ASSESSMENT — VISUAL ACUITY
OD_BCVA: CF 5FT
OS_BCVA: 20/20-1

## 2018-09-21 ENCOUNTER — DOCTOR'S OFFICE (OUTPATIENT)
Dept: URBAN - NONMETROPOLITAN AREA CLINIC 1 | Facility: CLINIC | Age: 73
Setting detail: OPHTHALMOLOGY
End: 2018-09-21
Payer: COMMERCIAL

## 2018-09-21 DIAGNOSIS — H34.8120: ICD-10-CM

## 2018-09-21 DIAGNOSIS — H25.13: ICD-10-CM

## 2018-09-21 DIAGNOSIS — H04.121: ICD-10-CM

## 2018-09-21 DIAGNOSIS — H35.3132: ICD-10-CM

## 2018-09-21 DIAGNOSIS — H43.812: ICD-10-CM

## 2018-09-21 DIAGNOSIS — H04.122: ICD-10-CM

## 2018-09-21 DIAGNOSIS — H43.811: ICD-10-CM

## 2018-09-21 DIAGNOSIS — H35.373: ICD-10-CM

## 2018-09-21 DIAGNOSIS — H40.053: ICD-10-CM

## 2018-09-21 PROCEDURE — 99024 POSTOP FOLLOW-UP VISIT: CPT | Performed by: OPHTHALMOLOGY

## 2018-09-21 PROCEDURE — 83861 MICROFLUID ANALY TEARS: CPT | Performed by: OPHTHALMOLOGY

## 2018-09-21 PROCEDURE — 92226 OPHTHALMOSCOPY EXT SUBSEQUENT: CPT | Performed by: OPHTHALMOLOGY

## 2018-09-21 PROCEDURE — 92134 CPTRZ OPH DX IMG PST SGM RTA: CPT | Performed by: OPHTHALMOLOGY

## 2018-09-21 ASSESSMENT — REFRACTION_AUTOREFRACTION
OS_AXIS: 83
OS_SPHERE: +1.25
OS_CYLINDER: -2.25
OD_AXIS: 94
OD_SPHERE: +4.00
OD_CYLINDER: -1.75

## 2018-09-21 ASSESSMENT — REFRACTION_MANIFEST
OD_VA1: 20/
OU_VA: 20/
OS_VA1: 20/
OD_VA3: 20/
OS_VA3: 20/
OD_VA1: 20/
OS_VA2: 20/
OS_VA1: 20/
OD_VA3: 20/
OU_VA: 20/
OS_VA3: 20/
OD_VA2: 20/
OD_VA2: 20/
OS_VA2: 20/

## 2018-09-21 ASSESSMENT — REFRACTION_CURRENTRX
OS_OVR_VA: 20/
OS_OVR_VA: 20/
OD_OVR_VA: 20/
OS_OVR_VA: 20/

## 2018-09-21 ASSESSMENT — CONFRONTATIONAL VISUAL FIELD TEST (CVF)
OD_FINDINGS: FULL
OS_FINDINGS: FULL

## 2018-09-21 ASSESSMENT — SPHEQUIV_DERIVED
OD_SPHEQUIV: 3.125
OS_SPHEQUIV: 0.125

## 2018-09-21 ASSESSMENT — VISUAL ACUITY
OD_BCVA: CF X5FT
OS_BCVA: 20/25-2

## 2018-09-27 ENCOUNTER — RX ONLY (RX ONLY)
Age: 73
End: 2018-09-27

## 2018-09-27 ENCOUNTER — DOCTOR'S OFFICE (OUTPATIENT)
Dept: URBAN - NONMETROPOLITAN AREA CLINIC 1 | Facility: CLINIC | Age: 73
Setting detail: OPHTHALMOLOGY
End: 2018-09-27
Payer: COMMERCIAL

## 2018-09-27 DIAGNOSIS — H34.8120: ICD-10-CM

## 2018-09-27 PROCEDURE — 99024 POSTOP FOLLOW-UP VISIT: CPT | Performed by: OPHTHALMOLOGY

## 2018-09-27 PROCEDURE — 67028 INJECTION EYE DRUG: CPT | Performed by: OPHTHALMOLOGY

## 2018-09-27 ASSESSMENT — REFRACTION_AUTOREFRACTION
OD_AXIS: 94
OS_AXIS: 83
OD_CYLINDER: -1.75
OS_CYLINDER: -2.25
OS_SPHERE: +1.25
OD_SPHERE: +4.00

## 2018-09-27 ASSESSMENT — VISUAL ACUITY
OD_BCVA: CF X5FT
OS_BCVA: 20/25-2

## 2018-09-27 ASSESSMENT — SPHEQUIV_DERIVED
OS_SPHEQUIV: 0.125
OD_SPHEQUIV: 3.125

## 2018-10-04 ENCOUNTER — DOCTOR'S OFFICE (OUTPATIENT)
Dept: URBAN - NONMETROPOLITAN AREA CLINIC 1 | Facility: CLINIC | Age: 73
Setting detail: OPHTHALMOLOGY
End: 2018-10-04
Payer: COMMERCIAL

## 2018-10-04 ENCOUNTER — RX ONLY (RX ONLY)
Age: 73
End: 2018-10-04

## 2018-10-04 DIAGNOSIS — H34.8120: ICD-10-CM

## 2018-10-04 PROCEDURE — 67028 INJECTION EYE DRUG: CPT | Performed by: OPHTHALMOLOGY

## 2018-10-04 PROCEDURE — 99024 POSTOP FOLLOW-UP VISIT: CPT | Performed by: OPHTHALMOLOGY

## 2018-10-04 ASSESSMENT — REFRACTION_AUTOREFRACTION
OS_CYLINDER: -2.25
OD_CYLINDER: -1.75
OD_AXIS: 94
OS_AXIS: 83
OD_SPHERE: +4.00
OS_SPHERE: +1.25

## 2018-10-04 ASSESSMENT — SPHEQUIV_DERIVED
OS_SPHEQUIV: 0.125
OD_SPHEQUIV: 3.125

## 2018-10-04 ASSESSMENT — VISUAL ACUITY
OS_BCVA: 20/25-2
OD_BCVA: CF X5FT

## 2018-10-05 ENCOUNTER — DOCTOR'S OFFICE (OUTPATIENT)
Dept: URBAN - NONMETROPOLITAN AREA CLINIC 1 | Facility: CLINIC | Age: 73
Setting detail: OPHTHALMOLOGY
End: 2018-10-05
Payer: COMMERCIAL

## 2018-10-05 DIAGNOSIS — H43.12: ICD-10-CM

## 2018-10-05 PROCEDURE — 99024 POSTOP FOLLOW-UP VISIT: CPT | Performed by: OPHTHALMOLOGY

## 2018-10-05 PROCEDURE — 76512 OPH US DX B-SCAN: CPT | Performed by: OPHTHALMOLOGY

## 2018-10-05 ASSESSMENT — REFRACTION_CURRENTRX
OS_OVR_VA: 20/
OD_OVR_VA: 20/
OD_OVR_VA: 20/
OS_OVR_VA: 20/
OS_OVR_VA: 20/
OD_OVR_VA: 20/

## 2018-10-05 ASSESSMENT — REFRACTION_MANIFEST
OS_VA1: 20/
OS_VA2: 20/
OD_VA3: 20/
OU_VA: 20/
OS_VA1: 20/
OS_VA2: 20/
OD_VA1: 20/
OD_VA2: 20/
OS_VA3: 20/
OD_VA3: 20/
OU_VA: 20/
OD_VA1: 20/
OD_VA2: 20/
OS_VA3: 20/

## 2018-10-05 ASSESSMENT — REFRACTION_AUTOREFRACTION
OS_CYLINDER: -2.25
OS_SPHERE: +1.25
OS_AXIS: 83
OD_SPHERE: +4.00
OD_CYLINDER: -1.75
OD_AXIS: 94

## 2018-10-05 ASSESSMENT — CONFRONTATIONAL VISUAL FIELD TEST (CVF)
OD_FINDINGS: FULL
OS_FINDINGS: FULL

## 2018-10-05 ASSESSMENT — VISUAL ACUITY
OS_BCVA: 20/25
OD_BCVA: CF@FACE

## 2018-10-05 ASSESSMENT — SPHEQUIV_DERIVED
OS_SPHEQUIV: 0.125
OD_SPHEQUIV: 3.125

## 2018-10-06 ENCOUNTER — RX ONLY (RX ONLY)
Age: 73
End: 2018-10-06

## 2018-10-08 ENCOUNTER — DOCTOR'S OFFICE (OUTPATIENT)
Dept: URBAN - NONMETROPOLITAN AREA CLINIC 1 | Facility: CLINIC | Age: 73
Setting detail: OPHTHALMOLOGY
End: 2018-10-08
Payer: COMMERCIAL

## 2018-10-08 ENCOUNTER — RX ONLY (RX ONLY)
Age: 73
End: 2018-10-08

## 2018-10-08 DIAGNOSIS — H43.12: ICD-10-CM

## 2018-10-08 PROCEDURE — 99024 POSTOP FOLLOW-UP VISIT: CPT | Performed by: OPHTHALMOLOGY

## 2018-10-08 PROCEDURE — 92226 OPHTHALMOSCOPY EXT SUBSEQUENT: CPT | Performed by: OPHTHALMOLOGY

## 2018-10-08 ASSESSMENT — REFRACTION_CURRENTRX
OD_OVR_VA: 20/
OS_OVR_VA: 20/
OD_OVR_VA: 20/
OD_OVR_VA: 20/
OS_OVR_VA: 20/
OS_OVR_VA: 20/

## 2018-10-08 ASSESSMENT — REFRACTION_AUTOREFRACTION
OD_SPHERE: +4.00
OD_AXIS: 94
OS_SPHERE: +1.25
OS_CYLINDER: -2.25
OS_AXIS: 83
OD_CYLINDER: -1.75

## 2018-10-08 ASSESSMENT — REFRACTION_MANIFEST
OS_VA2: 20/
OD_VA3: 20/
OS_VA3: 20/
OD_VA2: 20/
OS_VA1: 20/
OU_VA: 20/
OD_VA1: 20/
OS_VA1: 20/
OD_VA1: 20/
OS_VA3: 20/
OD_VA2: 20/
OS_VA2: 20/
OD_VA3: 20/
OU_VA: 20/

## 2018-10-08 ASSESSMENT — CONFRONTATIONAL VISUAL FIELD TEST (CVF)
OD_FINDINGS: FULL
OS_FINDINGS: FULL

## 2018-10-08 ASSESSMENT — VISUAL ACUITY
OD_BCVA: CF X6FT
OS_BCVA: 20/20

## 2018-10-08 ASSESSMENT — SPHEQUIV_DERIVED
OS_SPHEQUIV: 0.125
OD_SPHEQUIV: 3.125

## 2018-10-11 ENCOUNTER — DOCTOR'S OFFICE (OUTPATIENT)
Dept: URBAN - NONMETROPOLITAN AREA CLINIC 1 | Facility: CLINIC | Age: 73
Setting detail: OPHTHALMOLOGY
End: 2018-10-11
Payer: COMMERCIAL

## 2018-10-11 DIAGNOSIS — H43.12: ICD-10-CM

## 2018-10-11 PROCEDURE — 92250 FUNDUS PHOTOGRAPHY W/I&R: CPT | Performed by: OPHTHALMOLOGY

## 2018-10-11 PROCEDURE — 92235 FLUORESCEIN ANGRPH MLTIFRAME: CPT | Performed by: OPHTHALMOLOGY

## 2018-10-11 ASSESSMENT — REFRACTION_MANIFEST
OU_VA: 20/
OS_VA3: 20/
OD_VA1: 20/
OD_VA1: 20/
OS_VA1: 20/
OD_VA3: 20/
OS_VA2: 20/
OS_VA3: 20/
OD_VA2: 20/
OU_VA: 20/
OD_VA3: 20/
OD_VA2: 20/
OS_VA2: 20/
OS_VA1: 20/

## 2018-10-11 ASSESSMENT — SPHEQUIV_DERIVED
OD_SPHEQUIV: 3.125
OS_SPHEQUIV: 0.125

## 2018-10-11 ASSESSMENT — REFRACTION_CURRENTRX
OD_OVR_VA: 20/
OS_OVR_VA: 20/
OD_OVR_VA: 20/
OD_OVR_VA: 20/
OS_OVR_VA: 20/
OS_OVR_VA: 20/

## 2018-10-11 ASSESSMENT — VISUAL ACUITY
OD_BCVA: CF X6FT
OS_BCVA: 20/20

## 2018-10-11 ASSESSMENT — REFRACTION_AUTOREFRACTION
OD_SPHERE: +4.00
OD_CYLINDER: -1.75
OD_AXIS: 94
OS_AXIS: 83
OS_SPHERE: +1.25
OS_CYLINDER: -2.25

## 2018-10-19 ENCOUNTER — DOCTOR'S OFFICE (OUTPATIENT)
Dept: URBAN - NONMETROPOLITAN AREA CLINIC 1 | Facility: CLINIC | Age: 73
Setting detail: OPHTHALMOLOGY
End: 2018-10-19
Payer: COMMERCIAL

## 2018-10-19 DIAGNOSIS — H35.373: ICD-10-CM

## 2018-10-19 DIAGNOSIS — H43.811: ICD-10-CM

## 2018-10-19 DIAGNOSIS — H35.3132: ICD-10-CM

## 2018-10-19 DIAGNOSIS — H43.12: ICD-10-CM

## 2018-10-19 DIAGNOSIS — H25.13: ICD-10-CM

## 2018-10-19 DIAGNOSIS — H34.8120: ICD-10-CM

## 2018-10-19 DIAGNOSIS — H04.121: ICD-10-CM

## 2018-10-19 DIAGNOSIS — H04.122: ICD-10-CM

## 2018-10-19 DIAGNOSIS — H43.812: ICD-10-CM

## 2018-10-19 DIAGNOSIS — H40.053: ICD-10-CM

## 2018-10-19 PROCEDURE — 92134 CPTRZ OPH DX IMG PST SGM RTA: CPT | Performed by: OPHTHALMOLOGY

## 2018-10-19 PROCEDURE — 92014 COMPRE OPH EXAM EST PT 1/>: CPT | Performed by: OPHTHALMOLOGY

## 2018-10-19 ASSESSMENT — REFRACTION_MANIFEST
OS_VA1: 20/
OD_VA2: 20/
OS_VA3: 20/
OD_VA3: 20/
OD_VA1: 20/
OU_VA: 20/
OD_VA1: 20/
OD_VA2: 20/
OS_VA2: 20/
OD_VA3: 20/
OS_VA2: 20/
OS_VA1: 20/
OS_VA3: 20/
OU_VA: 20/

## 2018-10-19 ASSESSMENT — SPHEQUIV_DERIVED
OD_SPHEQUIV: 3.125
OS_SPHEQUIV: 0.125

## 2018-10-19 ASSESSMENT — REFRACTION_CURRENTRX
OS_OVR_VA: 20/
OD_OVR_VA: 20/
OS_OVR_VA: 20/
OD_OVR_VA: 20/
OD_OVR_VA: 20/
OS_OVR_VA: 20/

## 2018-10-19 ASSESSMENT — REFRACTION_AUTOREFRACTION
OD_CYLINDER: -1.75
OD_SPHERE: +4.00
OS_AXIS: 83
OS_CYLINDER: -2.25
OS_SPHERE: +1.25
OD_AXIS: 94

## 2018-10-19 ASSESSMENT — VISUAL ACUITY
OD_BCVA: 20/400
OS_BCVA: 20/20

## 2018-10-19 ASSESSMENT — CONFRONTATIONAL VISUAL FIELD TEST (CVF)
OS_FINDINGS: FULL
OD_FINDINGS: FULL

## 2018-10-25 ENCOUNTER — DOCTOR'S OFFICE (OUTPATIENT)
Dept: URBAN - NONMETROPOLITAN AREA CLINIC 1 | Facility: CLINIC | Age: 73
Setting detail: OPHTHALMOLOGY
End: 2018-10-25
Payer: COMMERCIAL

## 2018-10-25 DIAGNOSIS — H34.8120: ICD-10-CM

## 2018-10-25 DIAGNOSIS — H35.373: ICD-10-CM

## 2018-10-25 DIAGNOSIS — H43.811: ICD-10-CM

## 2018-10-25 DIAGNOSIS — H40.053: ICD-10-CM

## 2018-10-25 DIAGNOSIS — H04.121: ICD-10-CM

## 2018-10-25 DIAGNOSIS — H04.122: ICD-10-CM

## 2018-10-25 DIAGNOSIS — H43.12: ICD-10-CM

## 2018-10-25 DIAGNOSIS — H25.13: ICD-10-CM

## 2018-10-25 DIAGNOSIS — H35.3132: ICD-10-CM

## 2018-10-25 DIAGNOSIS — H43.812: ICD-10-CM

## 2018-10-25 PROCEDURE — 92014 COMPRE OPH EXAM EST PT 1/>: CPT | Performed by: OPHTHALMOLOGY

## 2018-10-25 PROCEDURE — 92226 OPHTHALMOSCOPY EXT SUBSEQUENT: CPT | Performed by: OPHTHALMOLOGY

## 2018-10-25 ASSESSMENT — REFRACTION_MANIFEST
OS_VA1: 20/
OD_VA3: 20/
OD_VA1: 20/
OS_VA3: 20/
OS_VA1: 20/
OD_VA2: 20/
OU_VA: 20/
OD_VA2: 20/
OS_VA2: 20/
OU_VA: 20/
OD_VA1: 20/
OS_VA3: 20/
OD_VA3: 20/
OS_VA2: 20/

## 2018-10-25 ASSESSMENT — REFRACTION_CURRENTRX
OD_OVR_VA: 20/
OS_OVR_VA: 20/
OD_OVR_VA: 20/
OS_OVR_VA: 20/
OD_OVR_VA: 20/
OS_OVR_VA: 20/

## 2018-10-25 ASSESSMENT — VISUAL ACUITY
OD_BCVA: 20/CF 6FT
OS_BCVA: 20/25

## 2018-10-25 ASSESSMENT — LID EXAM ASSESSMENTS: OD_TRICHIASIS: RLL 1+

## 2018-10-25 ASSESSMENT — REFRACTION_AUTOREFRACTION
OS_AXIS: 83
OD_SPHERE: +4.00
OS_SPHERE: +1.25
OS_CYLINDER: -2.25
OD_CYLINDER: -1.75
OD_AXIS: 94

## 2018-10-25 ASSESSMENT — SPHEQUIV_DERIVED
OD_SPHEQUIV: 3.125
OS_SPHEQUIV: 0.125

## 2018-10-25 ASSESSMENT — CONFRONTATIONAL VISUAL FIELD TEST (CVF)
OD_FINDINGS: FULL
OS_FINDINGS: FULL

## 2018-11-15 ENCOUNTER — DOCTOR'S OFFICE (OUTPATIENT)
Dept: URBAN - NONMETROPOLITAN AREA CLINIC 1 | Facility: CLINIC | Age: 73
Setting detail: OPHTHALMOLOGY
End: 2018-11-15
Payer: COMMERCIAL

## 2018-11-15 DIAGNOSIS — H35.3132: ICD-10-CM

## 2018-11-15 DIAGNOSIS — H34.8120: ICD-10-CM

## 2018-11-15 DIAGNOSIS — H40.053: ICD-10-CM

## 2018-11-15 DIAGNOSIS — H43.811: ICD-10-CM

## 2018-11-15 DIAGNOSIS — H04.121: ICD-10-CM

## 2018-11-15 DIAGNOSIS — H43.12: ICD-10-CM

## 2018-11-15 DIAGNOSIS — H04.122: ICD-10-CM

## 2018-11-15 DIAGNOSIS — H25.13: ICD-10-CM

## 2018-11-15 DIAGNOSIS — H35.373: ICD-10-CM

## 2018-11-15 DIAGNOSIS — H43.812: ICD-10-CM

## 2018-11-15 PROCEDURE — 92014 COMPRE OPH EXAM EST PT 1/>: CPT | Performed by: OPHTHALMOLOGY

## 2018-11-15 PROCEDURE — 92226 OPHTHALMOSCOPY EXT SUBSEQUENT: CPT | Performed by: OPHTHALMOLOGY

## 2018-11-15 ASSESSMENT — REFRACTION_MANIFEST
OS_VA2: 20/
OD_VA1: 20/
OS_VA2: 20/
OU_VA: 20/
OS_VA3: 20/
OS_VA1: 20/
OD_VA2: 20/
OS_VA1: 20/
OD_VA3: 20/
OD_VA2: 20/
OD_VA3: 20/
OU_VA: 20/
OD_VA1: 20/
OS_VA3: 20/

## 2018-11-15 ASSESSMENT — SPHEQUIV_DERIVED
OS_SPHEQUIV: 0.125
OD_SPHEQUIV: 3.125

## 2018-11-15 ASSESSMENT — REFRACTION_CURRENTRX
OS_OVR_VA: 20/
OD_OVR_VA: 20/
OS_OVR_VA: 20/
OS_OVR_VA: 20/

## 2018-11-15 ASSESSMENT — LID EXAM ASSESSMENTS: OD_TRICHIASIS: RLL 1+

## 2018-11-15 ASSESSMENT — REFRACTION_AUTOREFRACTION
OD_SPHERE: +4.00
OS_CYLINDER: -2.25
OD_CYLINDER: -1.75
OD_AXIS: 94
OS_SPHERE: +1.25
OS_AXIS: 83

## 2018-11-15 ASSESSMENT — VISUAL ACUITY
OS_BCVA: 20/20-1
OD_BCVA: 20/400

## 2018-11-15 ASSESSMENT — CONFRONTATIONAL VISUAL FIELD TEST (CVF)
OD_FINDINGS: FULL
OS_FINDINGS: FULL

## 2018-11-30 ENCOUNTER — DOCTOR'S OFFICE (OUTPATIENT)
Dept: URBAN - NONMETROPOLITAN AREA CLINIC 1 | Facility: CLINIC | Age: 73
Setting detail: OPHTHALMOLOGY
End: 2018-11-30
Payer: COMMERCIAL

## 2018-11-30 DIAGNOSIS — H35.3132: ICD-10-CM

## 2018-11-30 DIAGNOSIS — H34.8120: ICD-10-CM

## 2018-11-30 PROCEDURE — 67028 INJECTION EYE DRUG: CPT | Performed by: OPHTHALMOLOGY

## 2018-11-30 PROCEDURE — 92134 CPTRZ OPH DX IMG PST SGM RTA: CPT | Performed by: OPHTHALMOLOGY

## 2018-11-30 ASSESSMENT — REFRACTION_AUTOREFRACTION
OS_CYLINDER: -2.25
OD_AXIS: 94
OD_SPHERE: +4.00
OS_SPHERE: +1.25
OD_CYLINDER: -1.75
OS_AXIS: 83

## 2018-11-30 ASSESSMENT — REFRACTION_CURRENTRX
OD_OVR_VA: 20/
OS_OVR_VA: 20/
OS_OVR_VA: 20/
OD_OVR_VA: 20/
OS_OVR_VA: 20/
OD_OVR_VA: 20/

## 2018-11-30 ASSESSMENT — REFRACTION_MANIFEST
OD_VA3: 20/
OS_VA3: 20/
OS_VA2: 20/
OD_VA1: 20/
OD_VA2: 20/
OS_VA2: 20/
OU_VA: 20/
OS_VA1: 20/
OS_VA3: 20/
OD_VA2: 20/
OD_VA1: 20/
OS_VA1: 20/
OU_VA: 20/
OD_VA3: 20/

## 2018-11-30 ASSESSMENT — CONFRONTATIONAL VISUAL FIELD TEST (CVF)
OD_FINDINGS: FULL
OS_FINDINGS: FULL

## 2018-11-30 ASSESSMENT — SPHEQUIV_DERIVED
OD_SPHEQUIV: 3.125
OS_SPHEQUIV: 0.125

## 2018-11-30 ASSESSMENT — VISUAL ACUITY
OD_BCVA: 20/400
OS_BCVA: 20/25

## 2019-01-07 ENCOUNTER — DOCTOR'S OFFICE (OUTPATIENT)
Dept: URBAN - NONMETROPOLITAN AREA CLINIC 1 | Facility: CLINIC | Age: 74
Setting detail: OPHTHALMOLOGY
End: 2019-01-07
Payer: COMMERCIAL

## 2019-01-07 DIAGNOSIS — H43.811: ICD-10-CM

## 2019-01-07 DIAGNOSIS — H34.8120: ICD-10-CM

## 2019-01-07 DIAGNOSIS — H04.122: ICD-10-CM

## 2019-01-07 DIAGNOSIS — H35.373: ICD-10-CM

## 2019-01-07 DIAGNOSIS — H43.12: ICD-10-CM

## 2019-01-07 DIAGNOSIS — H35.3132: ICD-10-CM

## 2019-01-07 DIAGNOSIS — H43.812: ICD-10-CM

## 2019-01-07 DIAGNOSIS — H04.121: ICD-10-CM

## 2019-01-07 PROCEDURE — 83861 MICROFLUID ANALY TEARS: CPT | Performed by: OPHTHALMOLOGY

## 2019-01-07 PROCEDURE — 92226 OPHTHALMOSCOPY EXT SUBSEQUENT: CPT | Performed by: OPHTHALMOLOGY

## 2019-01-07 PROCEDURE — 92014 COMPRE OPH EXAM EST PT 1/>: CPT | Performed by: OPHTHALMOLOGY

## 2019-01-07 PROCEDURE — 67210 TREATMENT OF RETINAL LESION: CPT | Performed by: OPHTHALMOLOGY

## 2019-01-07 PROCEDURE — 92134 CPTRZ OPH DX IMG PST SGM RTA: CPT | Performed by: OPHTHALMOLOGY

## 2019-01-07 ASSESSMENT — REFRACTION_MANIFEST
OD_VA2: 20/
OS_VA2: 20/
OD_VA2: 20/
OD_VA1: 20/
OS_VA3: 20/
OS_VA1: 20/
OU_VA: 20/
OU_VA: 20/
OD_VA3: 20/
OS_VA3: 20/
OD_VA3: 20/
OD_VA1: 20/
OS_VA2: 20/
OS_VA1: 20/

## 2019-01-07 ASSESSMENT — REFRACTION_AUTOREFRACTION
OS_AXIS: 83
OS_SPHERE: +1.25
OD_AXIS: 94
OD_CYLINDER: -1.75
OS_CYLINDER: -2.25
OD_SPHERE: +4.00

## 2019-01-07 ASSESSMENT — REFRACTION_CURRENTRX
OS_OVR_VA: 20/
OD_OVR_VA: 20/
OD_OVR_VA: 20/
OS_OVR_VA: 20/
OD_OVR_VA: 20/
OS_OVR_VA: 20/

## 2019-01-07 ASSESSMENT — CONFRONTATIONAL VISUAL FIELD TEST (CVF)
OD_FINDINGS: FULL
OS_FINDINGS: FULL

## 2019-01-07 ASSESSMENT — LID EXAM ASSESSMENTS: OD_TRICHIASIS: RLL 1+

## 2019-01-07 ASSESSMENT — VISUAL ACUITY
OD_BCVA: CF 6FT
OS_BCVA: 20/25-2

## 2019-01-07 ASSESSMENT — SPHEQUIV_DERIVED
OD_SPHEQUIV: 3.125
OS_SPHEQUIV: 0.125

## 2019-01-24 ENCOUNTER — DOCTOR'S OFFICE (OUTPATIENT)
Dept: URBAN - NONMETROPOLITAN AREA CLINIC 1 | Facility: CLINIC | Age: 74
Setting detail: OPHTHALMOLOGY
End: 2019-01-24
Payer: COMMERCIAL

## 2019-01-24 DIAGNOSIS — H04.122: ICD-10-CM

## 2019-01-24 DIAGNOSIS — H34.8120: ICD-10-CM

## 2019-01-24 DIAGNOSIS — H43.811: ICD-10-CM

## 2019-01-24 DIAGNOSIS — H35.3132: ICD-10-CM

## 2019-01-24 DIAGNOSIS — H25.13: ICD-10-CM

## 2019-01-24 DIAGNOSIS — H43.812: ICD-10-CM

## 2019-01-24 DIAGNOSIS — H40.053: ICD-10-CM

## 2019-01-24 DIAGNOSIS — H04.121: ICD-10-CM

## 2019-01-24 DIAGNOSIS — H35.373: ICD-10-CM

## 2019-01-24 DIAGNOSIS — H43.12: ICD-10-CM

## 2019-01-24 PROCEDURE — 99024 POSTOP FOLLOW-UP VISIT: CPT | Performed by: OPHTHALMOLOGY

## 2019-01-24 PROCEDURE — 67028 INJECTION EYE DRUG: CPT | Performed by: OPHTHALMOLOGY

## 2019-01-24 ASSESSMENT — REFRACTION_MANIFEST
OS_VA3: 20/
OD_VA1: 20/
OS_VA2: 20/
OU_VA: 20/
OD_VA2: 20/
OD_VA1: 20/
OD_VA3: 20/
OS_VA1: 20/
OS_VA3: 20/
OS_VA2: 20/
OS_VA1: 20/
OU_VA: 20/
OD_VA3: 20/
OD_VA2: 20/

## 2019-01-24 ASSESSMENT — SPHEQUIV_DERIVED
OS_SPHEQUIV: 0.125
OD_SPHEQUIV: 3.125

## 2019-01-24 ASSESSMENT — REFRACTION_AUTOREFRACTION
OD_AXIS: 94
OD_CYLINDER: -1.75
OS_SPHERE: +1.25
OS_CYLINDER: -2.25
OS_AXIS: 83
OD_SPHERE: +4.00

## 2019-01-24 ASSESSMENT — REFRACTION_CURRENTRX
OD_OVR_VA: 20/
OS_OVR_VA: 20/
OS_OVR_VA: 20/
OD_OVR_VA: 20/
OD_OVR_VA: 20/
OS_OVR_VA: 20/

## 2019-01-24 ASSESSMENT — VISUAL ACUITY
OD_BCVA: CF 6FT
OS_BCVA: 20/25-2

## 2019-02-07 ENCOUNTER — DOCTOR'S OFFICE (OUTPATIENT)
Dept: URBAN - NONMETROPOLITAN AREA CLINIC 1 | Facility: CLINIC | Age: 74
Setting detail: OPHTHALMOLOGY
End: 2019-02-07
Payer: COMMERCIAL

## 2019-02-07 DIAGNOSIS — H35.3132: ICD-10-CM

## 2019-02-07 DIAGNOSIS — H43.12: ICD-10-CM

## 2019-02-07 DIAGNOSIS — H43.811: ICD-10-CM

## 2019-02-07 DIAGNOSIS — H43.813: ICD-10-CM

## 2019-02-07 DIAGNOSIS — H34.8120: ICD-10-CM

## 2019-02-07 DIAGNOSIS — H35.373: ICD-10-CM

## 2019-02-07 DIAGNOSIS — H43.812: ICD-10-CM

## 2019-02-07 PROBLEM — H00.14 CHALAZION; LEFT UPPER LID: Status: RESOLVED | Noted: 2017-10-12 | Resolved: 2019-02-07

## 2019-02-07 PROCEDURE — 99024 POSTOP FOLLOW-UP VISIT: CPT | Performed by: OPHTHALMOLOGY

## 2019-02-07 PROCEDURE — 92226 OPHTHALMOSCOPY EXT SUBSEQUENT: CPT | Performed by: OPHTHALMOLOGY

## 2019-02-07 PROCEDURE — 92134 CPTRZ OPH DX IMG PST SGM RTA: CPT | Performed by: OPHTHALMOLOGY

## 2019-02-07 ASSESSMENT — REFRACTION_CURRENTRX
OS_OVR_VA: 20/
OD_OVR_VA: 20/
OS_OVR_VA: 20/
OS_OVR_VA: 20/
OD_OVR_VA: 20/
OD_OVR_VA: 20/

## 2019-02-07 ASSESSMENT — REFRACTION_MANIFEST
OS_VA2: 20/
OU_VA: 20/
OS_VA3: 20/
OS_VA1: 20/
OD_VA2: 20/
OD_VA1: 20/
OD_VA3: 20/
OS_VA3: 20/
OD_VA3: 20/
OD_VA1: 20/
OD_VA2: 20/
OS_VA1: 20/
OU_VA: 20/
OS_VA2: 20/

## 2019-02-07 ASSESSMENT — REFRACTION_AUTOREFRACTION
OS_SPHERE: +1.25
OS_CYLINDER: -2.25
OD_SPHERE: +4.00
OS_AXIS: 83
OD_AXIS: 94
OD_CYLINDER: -1.75

## 2019-02-07 ASSESSMENT — SPHEQUIV_DERIVED
OD_SPHEQUIV: 3.125
OS_SPHEQUIV: 0.125

## 2019-02-07 ASSESSMENT — CONFRONTATIONAL VISUAL FIELD TEST (CVF)
OS_FINDINGS: FULL
OD_FINDINGS: FULL

## 2019-02-07 ASSESSMENT — VISUAL ACUITY
OS_BCVA: 20/25
OD_BCVA: 20/400

## 2019-02-07 ASSESSMENT — LID EXAM ASSESSMENTS: OD_TRICHIASIS: RLL 1+

## 2019-02-14 ENCOUNTER — DOCTOR'S OFFICE (OUTPATIENT)
Dept: URBAN - NONMETROPOLITAN AREA CLINIC 1 | Facility: CLINIC | Age: 74
Setting detail: OPHTHALMOLOGY
End: 2019-02-14
Payer: COMMERCIAL

## 2019-02-14 DIAGNOSIS — H34.8120: ICD-10-CM

## 2019-02-14 DIAGNOSIS — H43.811: ICD-10-CM

## 2019-02-14 DIAGNOSIS — H43.12: ICD-10-CM

## 2019-02-14 DIAGNOSIS — H25.13: ICD-10-CM

## 2019-02-14 DIAGNOSIS — H40.053: ICD-10-CM

## 2019-02-14 DIAGNOSIS — H35.373: ICD-10-CM

## 2019-02-14 DIAGNOSIS — H04.121: ICD-10-CM

## 2019-02-14 DIAGNOSIS — H04.122: ICD-10-CM

## 2019-02-14 DIAGNOSIS — H43.812: ICD-10-CM

## 2019-02-14 DIAGNOSIS — H35.3132: ICD-10-CM

## 2019-02-14 PROCEDURE — 99024 POSTOP FOLLOW-UP VISIT: CPT | Performed by: OPHTHALMOLOGY

## 2019-02-14 PROCEDURE — 92250 FUNDUS PHOTOGRAPHY W/I&R: CPT | Performed by: OPHTHALMOLOGY

## 2019-02-14 PROCEDURE — 92235 FLUORESCEIN ANGRPH MLTIFRAME: CPT | Performed by: OPHTHALMOLOGY

## 2019-02-14 ASSESSMENT — REFRACTION_AUTOREFRACTION
OD_CYLINDER: -1.75
OS_SPHERE: +1.25
OD_AXIS: 94
OD_SPHERE: +4.00
OS_AXIS: 83
OS_CYLINDER: -2.25

## 2019-02-14 ASSESSMENT — REFRACTION_MANIFEST
OS_VA3: 20/
OS_VA3: 20/
OS_VA2: 20/
OD_VA3: 20/
OU_VA: 20/
OS_VA1: 20/
OS_VA1: 20/
OD_VA3: 20/
OD_VA2: 20/
OU_VA: 20/
OD_VA1: 20/
OD_VA1: 20/
OD_VA2: 20/
OS_VA2: 20/

## 2019-02-14 ASSESSMENT — SPHEQUIV_DERIVED
OS_SPHEQUIV: 0.125
OD_SPHEQUIV: 3.125

## 2019-02-14 ASSESSMENT — REFRACTION_CURRENTRX
OS_OVR_VA: 20/
OD_OVR_VA: 20/
OS_OVR_VA: 20/
OS_OVR_VA: 20/
OD_OVR_VA: 20/
OD_OVR_VA: 20/

## 2019-02-14 ASSESSMENT — CONFRONTATIONAL VISUAL FIELD TEST (CVF)
OS_FINDINGS: FULL
OD_FINDINGS: FULL

## 2019-02-14 ASSESSMENT — LID EXAM ASSESSMENTS: OD_TRICHIASIS: RLL 1+

## 2019-02-14 ASSESSMENT — VISUAL ACUITY
OD_BCVA: 20/400
OS_BCVA: 20/25

## 2019-02-21 ENCOUNTER — DOCTOR'S OFFICE (OUTPATIENT)
Dept: URBAN - NONMETROPOLITAN AREA CLINIC 1 | Facility: CLINIC | Age: 74
Setting detail: OPHTHALMOLOGY
End: 2019-02-21
Payer: COMMERCIAL

## 2019-02-21 DIAGNOSIS — H34.8120: ICD-10-CM

## 2019-02-21 PROCEDURE — 67028 INJECTION EYE DRUG: CPT | Performed by: OPHTHALMOLOGY

## 2019-02-21 PROCEDURE — 99024 POSTOP FOLLOW-UP VISIT: CPT | Performed by: OPHTHALMOLOGY

## 2019-02-21 ASSESSMENT — REFRACTION_MANIFEST
OU_VA: 20/
OD_VA2: 20/
OD_VA3: 20/
OU_VA: 20/
OD_VA2: 20/
OS_VA2: 20/
OS_VA3: 20/
OS_VA2: 20/
OD_VA1: 20/
OS_VA3: 20/
OS_VA1: 20/
OD_VA3: 20/
OD_VA1: 20/
OS_VA1: 20/

## 2019-02-21 ASSESSMENT — REFRACTION_AUTOREFRACTION
OS_CYLINDER: -2.25
OS_SPHERE: +1.25
OD_SPHERE: +4.00
OD_CYLINDER: -1.75
OD_AXIS: 94
OS_AXIS: 83

## 2019-02-21 ASSESSMENT — REFRACTION_CURRENTRX
OD_OVR_VA: 20/
OD_OVR_VA: 20/
OS_OVR_VA: 20/
OS_OVR_VA: 20/
OD_OVR_VA: 20/
OS_OVR_VA: 20/

## 2019-02-21 ASSESSMENT — VISUAL ACUITY
OD_BCVA: 20/400
OS_BCVA: 20/25

## 2019-02-21 ASSESSMENT — SPHEQUIV_DERIVED
OS_SPHEQUIV: 0.125
OD_SPHEQUIV: 3.125

## 2019-03-14 ENCOUNTER — DOCTOR'S OFFICE (OUTPATIENT)
Dept: URBAN - NONMETROPOLITAN AREA CLINIC 1 | Facility: CLINIC | Age: 74
Setting detail: OPHTHALMOLOGY
End: 2019-03-14
Payer: COMMERCIAL

## 2019-03-14 DIAGNOSIS — H35.3132: ICD-10-CM

## 2019-03-14 DIAGNOSIS — H43.12: ICD-10-CM

## 2019-03-14 DIAGNOSIS — H43.812: ICD-10-CM

## 2019-03-14 DIAGNOSIS — H25.13: ICD-10-CM

## 2019-03-14 DIAGNOSIS — H43.811: ICD-10-CM

## 2019-03-14 DIAGNOSIS — H04.121: ICD-10-CM

## 2019-03-14 DIAGNOSIS — H40.053: ICD-10-CM

## 2019-03-14 DIAGNOSIS — H43.813: ICD-10-CM

## 2019-03-14 DIAGNOSIS — H35.373: ICD-10-CM

## 2019-03-14 DIAGNOSIS — H04.122: ICD-10-CM

## 2019-03-14 PROCEDURE — 92134 CPTRZ OPH DX IMG PST SGM RTA: CPT | Performed by: OPHTHALMOLOGY

## 2019-03-14 PROCEDURE — 99024 POSTOP FOLLOW-UP VISIT: CPT | Performed by: OPHTHALMOLOGY

## 2019-03-14 ASSESSMENT — LID EXAM ASSESSMENTS: OD_TRICHIASIS: RLL 1+

## 2019-03-14 ASSESSMENT — REFRACTION_AUTOREFRACTION
OD_SPHERE: +4.00
OD_AXIS: 94
OS_SPHERE: +1.25
OS_AXIS: 83
OS_CYLINDER: -2.25
OD_CYLINDER: -1.75

## 2019-03-14 ASSESSMENT — REFRACTION_MANIFEST
OS_VA1: 20/
OU_VA: 20/
OS_VA2: 20/
OU_VA: 20/
OD_VA2: 20/
OD_VA1: 20/
OD_VA1: 20/
OD_VA3: 20/
OS_VA3: 20/
OS_VA1: 20/
OS_VA2: 20/
OD_VA2: 20/
OS_VA3: 20/
OD_VA3: 20/

## 2019-03-14 ASSESSMENT — REFRACTION_CURRENTRX
OD_OVR_VA: 20/
OD_OVR_VA: 20/
OS_OVR_VA: 20/
OD_OVR_VA: 20/

## 2019-03-14 ASSESSMENT — VISUAL ACUITY
OS_BCVA: 20/25
OD_BCVA: 20/400

## 2019-03-14 ASSESSMENT — SPHEQUIV_DERIVED
OS_SPHEQUIV: 0.125
OD_SPHEQUIV: 3.125

## 2019-03-21 ENCOUNTER — DOCTOR'S OFFICE (OUTPATIENT)
Dept: URBAN - NONMETROPOLITAN AREA CLINIC 1 | Facility: CLINIC | Age: 74
Setting detail: OPHTHALMOLOGY
End: 2019-03-21
Payer: COMMERCIAL

## 2019-03-21 DIAGNOSIS — H34.8120: ICD-10-CM

## 2019-03-21 PROCEDURE — 67028 INJECTION EYE DRUG: CPT | Performed by: OPHTHALMOLOGY

## 2019-03-21 PROCEDURE — 99024 POSTOP FOLLOW-UP VISIT: CPT | Performed by: OPHTHALMOLOGY

## 2019-03-21 ASSESSMENT — REFRACTION_CURRENTRX
OD_OVR_VA: 20/
OD_OVR_VA: 20/
OS_OVR_VA: 20/
OD_OVR_VA: 20/
OS_OVR_VA: 20/
OS_OVR_VA: 20/

## 2019-03-21 ASSESSMENT — SPHEQUIV_DERIVED
OD_SPHEQUIV: 3.125
OS_SPHEQUIV: 0.125

## 2019-03-21 ASSESSMENT — REFRACTION_MANIFEST
OD_VA2: 20/
OD_VA3: 20/
OD_VA3: 20/
OD_VA2: 20/
OU_VA: 20/
OD_VA1: 20/
OS_VA1: 20/
OS_VA2: 20/
OS_VA3: 20/
OD_VA1: 20/
OS_VA3: 20/
OS_VA1: 20/
OU_VA: 20/
OS_VA2: 20/

## 2019-03-21 ASSESSMENT — REFRACTION_AUTOREFRACTION
OD_SPHERE: +4.00
OD_CYLINDER: -1.75
OD_AXIS: 94
OS_CYLINDER: -2.25
OS_AXIS: 83
OS_SPHERE: +1.25

## 2019-03-21 ASSESSMENT — VISUAL ACUITY
OS_BCVA: 20/25
OD_BCVA: 20/400

## 2019-04-11 ENCOUNTER — DOCTOR'S OFFICE (OUTPATIENT)
Dept: URBAN - NONMETROPOLITAN AREA CLINIC 1 | Facility: CLINIC | Age: 74
Setting detail: OPHTHALMOLOGY
End: 2019-04-11
Payer: COMMERCIAL

## 2019-04-11 DIAGNOSIS — H35.373: ICD-10-CM

## 2019-04-11 DIAGNOSIS — H35.3132: ICD-10-CM

## 2019-04-11 DIAGNOSIS — H43.813: ICD-10-CM

## 2019-04-11 DIAGNOSIS — H43.811: ICD-10-CM

## 2019-04-11 DIAGNOSIS — H34.8120: ICD-10-CM

## 2019-04-11 DIAGNOSIS — H43.12: ICD-10-CM

## 2019-04-11 DIAGNOSIS — H43.812: ICD-10-CM

## 2019-04-11 PROCEDURE — 92134 CPTRZ OPH DX IMG PST SGM RTA: CPT | Performed by: OPHTHALMOLOGY

## 2019-04-11 PROCEDURE — 67210 TREATMENT OF RETINAL LESION: CPT | Performed by: OPHTHALMOLOGY

## 2019-04-11 PROCEDURE — 92226 OPHTHALMOSCOPY EXT SUBSEQUENT: CPT | Performed by: OPHTHALMOLOGY

## 2019-04-11 ASSESSMENT — REFRACTION_MANIFEST
OU_VA: 20/
OS_VA3: 20/
OD_VA3: 20/
OS_VA1: 20/
OS_VA2: 20/
OS_VA1: 20/
OD_VA1: 20/
OD_VA1: 20/
OD_VA2: 20/
OD_VA2: 20/
OS_VA3: 20/
OD_VA3: 20/
OU_VA: 20/
OS_VA2: 20/

## 2019-04-11 ASSESSMENT — REFRACTION_CURRENTRX
OD_OVR_VA: 20/
OS_OVR_VA: 20/
OD_OVR_VA: 20/
OD_OVR_VA: 20/

## 2019-04-11 ASSESSMENT — REFRACTION_AUTOREFRACTION
OD_AXIS: 94
OD_CYLINDER: -1.75
OS_AXIS: 83
OS_SPHERE: +1.25
OD_SPHERE: +4.00
OS_CYLINDER: -2.25

## 2019-04-11 ASSESSMENT — LID EXAM ASSESSMENTS: OD_TRICHIASIS: RLL 1+

## 2019-04-11 ASSESSMENT — CONFRONTATIONAL VISUAL FIELD TEST (CVF)
OD_FINDINGS: FULL
OS_FINDINGS: FULL

## 2019-04-11 ASSESSMENT — SPHEQUIV_DERIVED
OD_SPHEQUIV: 3.125
OS_SPHEQUIV: 0.125

## 2019-04-11 ASSESSMENT — VISUAL ACUITY
OD_BCVA: 20/400
OS_BCVA: 20/25

## 2019-04-19 ENCOUNTER — DOCTOR'S OFFICE (OUTPATIENT)
Dept: URBAN - NONMETROPOLITAN AREA CLINIC 1 | Facility: CLINIC | Age: 74
Setting detail: OPHTHALMOLOGY
End: 2019-04-19
Payer: COMMERCIAL

## 2019-04-19 DIAGNOSIS — H34.8120: ICD-10-CM

## 2019-04-19 PROCEDURE — 99024 POSTOP FOLLOW-UP VISIT: CPT | Performed by: OPHTHALMOLOGY

## 2019-04-19 PROCEDURE — 67028 INJECTION EYE DRUG: CPT | Performed by: OPHTHALMOLOGY

## 2019-04-19 ASSESSMENT — REFRACTION_CURRENTRX
OD_OVR_VA: 20/
OS_OVR_VA: 20/
OS_OVR_VA: 20/
OD_OVR_VA: 20/
OS_OVR_VA: 20/
OD_OVR_VA: 20/

## 2019-04-19 ASSESSMENT — REFRACTION_MANIFEST
OU_VA: 20/
OS_VA1: 20/
OD_VA3: 20/
OD_VA3: 20/
OU_VA: 20/
OD_VA2: 20/
OD_VA1: 20/
OS_VA1: 20/
OS_VA3: 20/
OD_VA2: 20/
OS_VA2: 20/
OS_VA3: 20/
OS_VA2: 20/
OD_VA1: 20/

## 2019-04-19 ASSESSMENT — REFRACTION_AUTOREFRACTION
OD_SPHERE: +4.00
OD_AXIS: 94
OS_AXIS: 83
OD_CYLINDER: -1.75
OS_SPHERE: +1.25
OS_CYLINDER: -2.25

## 2019-04-19 ASSESSMENT — VISUAL ACUITY
OD_BCVA: 20/400
OS_BCVA: 20/25

## 2019-04-19 ASSESSMENT — SPHEQUIV_DERIVED
OS_SPHEQUIV: 0.125
OD_SPHEQUIV: 3.125

## 2019-05-17 ENCOUNTER — DOCTOR'S OFFICE (OUTPATIENT)
Dept: URBAN - NONMETROPOLITAN AREA CLINIC 1 | Facility: CLINIC | Age: 74
Setting detail: OPHTHALMOLOGY
End: 2019-05-17
Payer: COMMERCIAL

## 2019-05-17 DIAGNOSIS — H25.13: ICD-10-CM

## 2019-05-17 DIAGNOSIS — H43.813: ICD-10-CM

## 2019-05-17 DIAGNOSIS — H43.812: ICD-10-CM

## 2019-05-17 DIAGNOSIS — H35.3132: ICD-10-CM

## 2019-05-17 DIAGNOSIS — H34.8120: ICD-10-CM

## 2019-05-17 DIAGNOSIS — H04.122: ICD-10-CM

## 2019-05-17 DIAGNOSIS — H04.121: ICD-10-CM

## 2019-05-17 DIAGNOSIS — H35.373: ICD-10-CM

## 2019-05-17 DIAGNOSIS — H43.811: ICD-10-CM

## 2019-05-17 DIAGNOSIS — H40.053: ICD-10-CM

## 2019-05-17 DIAGNOSIS — H43.12: ICD-10-CM

## 2019-05-17 PROCEDURE — 67028 INJECTION EYE DRUG: CPT | Performed by: OPHTHALMOLOGY

## 2019-05-17 PROCEDURE — 99024 POSTOP FOLLOW-UP VISIT: CPT | Performed by: OPHTHALMOLOGY

## 2019-05-17 PROCEDURE — 92226 OPHTHALMOSCOPY EXT SUBSEQUENT: CPT | Performed by: OPHTHALMOLOGY

## 2019-05-17 PROCEDURE — 92134 CPTRZ OPH DX IMG PST SGM RTA: CPT | Performed by: OPHTHALMOLOGY

## 2019-05-17 ASSESSMENT — LID EXAM ASSESSMENTS: OD_TRICHIASIS: RLL 1+

## 2019-05-17 ASSESSMENT — CONFRONTATIONAL VISUAL FIELD TEST (CVF)
OD_FINDINGS: FULL
OS_FINDINGS: FULL

## 2019-05-28 ASSESSMENT — REFRACTION_CURRENTRX
OD_OVR_VA: 20/
OD_OVR_VA: 20/
OS_OVR_VA: 20/
OD_OVR_VA: 20/
OS_OVR_VA: 20/
OS_OVR_VA: 20/

## 2019-05-28 ASSESSMENT — REFRACTION_MANIFEST
OD_VA2: 20/
OD_VA3: 20/
OS_VA2: 20/
OU_VA: 20/
OD_VA2: 20/
OD_VA1: 20/
OS_VA1: 20/
OD_VA1: 20/
OS_VA3: 20/
OS_VA2: 20/
OD_VA3: 20/
OS_VA1: 20/
OU_VA: 20/
OS_VA3: 20/

## 2019-05-28 ASSESSMENT — SPHEQUIV_DERIVED
OS_SPHEQUIV: 0.125
OD_SPHEQUIV: 3.125

## 2019-05-28 ASSESSMENT — VISUAL ACUITY
OD_BCVA: 20/400
OS_BCVA: 20/30+2

## 2019-05-28 ASSESSMENT — REFRACTION_AUTOREFRACTION
OS_CYLINDER: -2.25
OD_CYLINDER: -1.75
OD_SPHERE: +4.00
OD_AXIS: 94
OS_AXIS: 83
OS_SPHERE: +1.25

## 2019-05-31 ENCOUNTER — DOCTOR'S OFFICE (OUTPATIENT)
Dept: URBAN - NONMETROPOLITAN AREA CLINIC 1 | Facility: CLINIC | Age: 74
Setting detail: OPHTHALMOLOGY
End: 2019-05-31
Payer: COMMERCIAL

## 2019-05-31 DIAGNOSIS — H43.812: ICD-10-CM

## 2019-05-31 DIAGNOSIS — H34.8120: ICD-10-CM

## 2019-05-31 DIAGNOSIS — H35.3132: ICD-10-CM

## 2019-05-31 DIAGNOSIS — H40.053: ICD-10-CM

## 2019-05-31 DIAGNOSIS — H25.13: ICD-10-CM

## 2019-05-31 DIAGNOSIS — H04.121: ICD-10-CM

## 2019-05-31 DIAGNOSIS — H35.373: ICD-10-CM

## 2019-05-31 DIAGNOSIS — H04.122: ICD-10-CM

## 2019-05-31 DIAGNOSIS — H43.811: ICD-10-CM

## 2019-05-31 DIAGNOSIS — H43.12: ICD-10-CM

## 2019-05-31 PROCEDURE — 92235 FLUORESCEIN ANGRPH MLTIFRAME: CPT | Performed by: OPHTHALMOLOGY

## 2019-05-31 PROCEDURE — 92250 FUNDUS PHOTOGRAPHY W/I&R: CPT | Performed by: OPHTHALMOLOGY

## 2019-05-31 PROCEDURE — 99024 POSTOP FOLLOW-UP VISIT: CPT | Performed by: OPHTHALMOLOGY

## 2019-05-31 ASSESSMENT — REFRACTION_MANIFEST
OU_VA: 20/
OS_VA2: 20/
OD_VA2: 20/
OD_VA3: 20/
OD_VA2: 20/
OD_VA3: 20/
OD_VA1: 20/
OU_VA: 20/
OS_VA3: 20/
OS_VA1: 20/
OS_VA2: 20/
OD_VA1: 20/
OS_VA3: 20/
OS_VA1: 20/

## 2019-05-31 ASSESSMENT — REFRACTION_CURRENTRX
OS_OVR_VA: 20/
OD_OVR_VA: 20/

## 2019-05-31 ASSESSMENT — SPHEQUIV_DERIVED
OS_SPHEQUIV: 0.125
OD_SPHEQUIV: 3.125

## 2019-05-31 ASSESSMENT — REFRACTION_AUTOREFRACTION
OD_CYLINDER: -1.75
OD_SPHERE: +4.00
OD_AXIS: 94
OS_CYLINDER: -2.25
OS_AXIS: 83
OS_SPHERE: +1.25

## 2019-05-31 ASSESSMENT — CONFRONTATIONAL VISUAL FIELD TEST (CVF)
OS_FINDINGS: FULL
OD_FINDINGS: FULL

## 2019-05-31 ASSESSMENT — VISUAL ACUITY
OS_BCVA: 20/30+2
OD_BCVA: 20/400

## 2019-05-31 ASSESSMENT — LID EXAM ASSESSMENTS: OD_TRICHIASIS: RLL 1+

## 2019-06-17 ENCOUNTER — DOCTOR'S OFFICE (OUTPATIENT)
Dept: URBAN - NONMETROPOLITAN AREA CLINIC 1 | Facility: CLINIC | Age: 74
Setting detail: OPHTHALMOLOGY
End: 2019-06-17
Payer: COMMERCIAL

## 2019-06-17 DIAGNOSIS — H34.8120: ICD-10-CM

## 2019-06-17 PROCEDURE — 99024 POSTOP FOLLOW-UP VISIT: CPT | Performed by: OPHTHALMOLOGY

## 2019-06-17 PROCEDURE — 67028 INJECTION EYE DRUG: CPT | Performed by: OPHTHALMOLOGY

## 2019-06-17 ASSESSMENT — REFRACTION_MANIFEST
OS_VA2: 20/
OD_VA2: 20/
OS_VA3: 20/
OS_VA1: 20/
OU_VA: 20/
OS_VA3: 20/
OU_VA: 20/
OD_VA3: 20/
OS_VA2: 20/
OD_VA2: 20/
OD_VA1: 20/
OD_VA3: 20/
OD_VA1: 20/
OS_VA1: 20/

## 2019-06-17 ASSESSMENT — REFRACTION_AUTOREFRACTION
OD_SPHERE: +4.00
OD_CYLINDER: -1.75
OS_AXIS: 83
OS_SPHERE: +1.25
OS_CYLINDER: -2.25
OD_AXIS: 94

## 2019-06-17 ASSESSMENT — REFRACTION_CURRENTRX
OS_OVR_VA: 20/
OS_OVR_VA: 20/
OD_OVR_VA: 20/
OS_OVR_VA: 20/
OD_OVR_VA: 20/
OD_OVR_VA: 20/

## 2019-06-17 ASSESSMENT — VISUAL ACUITY
OD_BCVA: 20/400
OS_BCVA: 20/30+2

## 2019-06-17 ASSESSMENT — SPHEQUIV_DERIVED
OD_SPHEQUIV: 3.125
OS_SPHEQUIV: 0.125

## 2019-07-22 ENCOUNTER — DOCTOR'S OFFICE (OUTPATIENT)
Dept: URBAN - NONMETROPOLITAN AREA CLINIC 1 | Facility: CLINIC | Age: 74
Setting detail: OPHTHALMOLOGY
End: 2019-07-22
Payer: COMMERCIAL

## 2019-07-22 DIAGNOSIS — H43.813: ICD-10-CM

## 2019-07-22 DIAGNOSIS — H35.373: ICD-10-CM

## 2019-07-22 DIAGNOSIS — H43.12: ICD-10-CM

## 2019-07-22 DIAGNOSIS — H34.8120: ICD-10-CM

## 2019-07-22 DIAGNOSIS — H35.3132: ICD-10-CM

## 2019-07-22 PROCEDURE — 92134 CPTRZ OPH DX IMG PST SGM RTA: CPT | Performed by: OPHTHALMOLOGY

## 2019-07-22 PROCEDURE — 92014 COMPRE OPH EXAM EST PT 1/>: CPT | Performed by: OPHTHALMOLOGY

## 2019-07-22 PROCEDURE — 67210 TREATMENT OF RETINAL LESION: CPT | Performed by: OPHTHALMOLOGY

## 2019-07-22 ASSESSMENT — REFRACTION_MANIFEST
OU_VA: 20/
OD_VA3: 20/
OD_VA2: 20/
OS_VA1: 20/
OS_VA2: 20/
OU_VA: 20/
OS_VA2: 20/
OD_VA2: 20/
OD_VA3: 20/
OD_VA1: 20/
OD_VA1: 20/
OS_VA1: 20/
OS_VA3: 20/
OS_VA3: 20/

## 2019-07-22 ASSESSMENT — REFRACTION_CURRENTRX
OD_OVR_VA: 20/
OD_OVR_VA: 20/
OS_OVR_VA: 20/
OD_OVR_VA: 20/

## 2019-07-22 ASSESSMENT — CONFRONTATIONAL VISUAL FIELD TEST (CVF)
OS_FINDINGS: FULL
OD_FINDINGS: FULL

## 2019-07-22 ASSESSMENT — REFRACTION_AUTOREFRACTION
OS_CYLINDER: -2.25
OS_AXIS: 83
OD_AXIS: 94
OD_CYLINDER: -1.75
OD_SPHERE: +4.00
OS_SPHERE: +1.25

## 2019-07-22 ASSESSMENT — VISUAL ACUITY
OS_BCVA: 20/25+2
OD_BCVA: 20/400

## 2019-07-22 ASSESSMENT — SPHEQUIV_DERIVED
OS_SPHEQUIV: 0.125
OD_SPHEQUIV: 3.125

## 2019-07-22 ASSESSMENT — LID EXAM ASSESSMENTS: OD_TRICHIASIS: RLL 1+

## 2019-07-29 ENCOUNTER — RX ONLY (RX ONLY)
Age: 74
End: 2019-07-29

## 2019-07-29 ENCOUNTER — DOCTOR'S OFFICE (OUTPATIENT)
Dept: URBAN - NONMETROPOLITAN AREA CLINIC 1 | Facility: CLINIC | Age: 74
Setting detail: OPHTHALMOLOGY
End: 2019-07-29
Payer: COMMERCIAL

## 2019-07-29 DIAGNOSIS — H34.8120: ICD-10-CM

## 2019-07-29 PROCEDURE — 99024 POSTOP FOLLOW-UP VISIT: CPT | Performed by: OPHTHALMOLOGY

## 2019-07-29 PROCEDURE — 67028 INJECTION EYE DRUG: CPT | Performed by: OPHTHALMOLOGY

## 2019-07-29 ASSESSMENT — VISUAL ACUITY
OD_BCVA: 20/400
OS_BCVA: 20/25+2

## 2019-07-29 ASSESSMENT — REFRACTION_CURRENTRX
OS_OVR_VA: 20/
OD_OVR_VA: 20/
OS_OVR_VA: 20/
OS_OVR_VA: 20/

## 2019-07-29 ASSESSMENT — SPHEQUIV_DERIVED
OS_SPHEQUIV: 0.125
OD_SPHEQUIV: 3.125

## 2019-07-29 ASSESSMENT — REFRACTION_MANIFEST
OD_VA1: 20/
OU_VA: 20/
OD_VA1: 20/
OD_VA2: 20/
OS_VA3: 20/
OS_VA2: 20/
OD_VA3: 20/
OS_VA2: 20/
OS_VA3: 20/
OD_VA3: 20/
OD_VA2: 20/
OS_VA1: 20/
OU_VA: 20/
OS_VA1: 20/

## 2019-07-29 ASSESSMENT — REFRACTION_AUTOREFRACTION
OD_SPHERE: +4.00
OD_CYLINDER: -1.75
OS_AXIS: 83
OS_CYLINDER: -2.25
OD_AXIS: 94
OS_SPHERE: +1.25

## 2019-08-19 ENCOUNTER — DOCTOR'S OFFICE (OUTPATIENT)
Dept: URBAN - NONMETROPOLITAN AREA CLINIC 1 | Facility: CLINIC | Age: 74
Setting detail: OPHTHALMOLOGY
End: 2019-08-19
Payer: COMMERCIAL

## 2019-08-19 DIAGNOSIS — H35.373: ICD-10-CM

## 2019-08-19 DIAGNOSIS — H43.811: ICD-10-CM

## 2019-08-19 DIAGNOSIS — H40.053: ICD-10-CM

## 2019-08-19 DIAGNOSIS — H43.813: ICD-10-CM

## 2019-08-19 DIAGNOSIS — H35.3132: ICD-10-CM

## 2019-08-19 DIAGNOSIS — H34.8120: ICD-10-CM

## 2019-08-19 DIAGNOSIS — H43.812: ICD-10-CM

## 2019-08-19 PROBLEM — H25.13 CATARACT NUCLEAR SCLEROSIS AGE RELATED; BOTH EYES: Status: ACTIVE | Noted: 2017-03-31

## 2019-08-19 PROBLEM — H43.12 VITREOUS HEMORRHAGE; LEFT EYE: Status: ACTIVE | Noted: 2018-10-05

## 2019-08-19 PROBLEM — H04.122 DRY EYE; RIGHT EYE, LEFT EYE: Status: ACTIVE | Noted: 2017-03-31

## 2019-08-19 PROBLEM — H04.121 DRY EYE; RIGHT EYE, LEFT EYE: Status: ACTIVE | Noted: 2017-03-31

## 2019-08-19 PROBLEM — H02.012 TRICHIASIS; RIGHT LOWER LID: Status: ACTIVE | Noted: 2018-10-25

## 2019-08-19 PROCEDURE — 92226 OPHTHALMOSCOPY EXT SUBSEQUENT: CPT | Performed by: OPHTHALMOLOGY

## 2019-08-19 PROCEDURE — 92134 CPTRZ OPH DX IMG PST SGM RTA: CPT | Performed by: OPHTHALMOLOGY

## 2019-08-19 PROCEDURE — 99024 POSTOP FOLLOW-UP VISIT: CPT | Performed by: OPHTHALMOLOGY

## 2019-08-19 ASSESSMENT — REFRACTION_MANIFEST
OD_VA1: 20/
OS_VA3: 20/
OD_VA1: 20/
OU_VA: 20/
OD_VA2: 20/
OS_VA2: 20/
OD_VA3: 20/
OS_VA2: 20/
OD_VA2: 20/
OU_VA: 20/
OS_VA3: 20/
OS_VA1: 20/
OD_VA3: 20/
OS_VA1: 20/

## 2019-08-19 ASSESSMENT — REFRACTION_AUTOREFRACTION
OS_CYLINDER: -2.25
OD_CYLINDER: -1.75
OD_AXIS: 94
OD_SPHERE: +4.00
OS_AXIS: 83
OS_SPHERE: +1.25

## 2019-08-19 ASSESSMENT — VISUAL ACUITY
OS_BCVA: 20/25-2
OD_BCVA: 20/400

## 2019-08-19 ASSESSMENT — REFRACTION_CURRENTRX
OS_OVR_VA: 20/
OD_OVR_VA: 20/
OS_OVR_VA: 20/
OD_OVR_VA: 20/
OS_OVR_VA: 20/
OD_OVR_VA: 20/

## 2019-08-19 ASSESSMENT — CONFRONTATIONAL VISUAL FIELD TEST (CVF)
OD_FINDINGS: FULL
OS_FINDINGS: FULL

## 2019-08-19 ASSESSMENT — SPHEQUIV_DERIVED
OS_SPHEQUIV: 0.125
OD_SPHEQUIV: 3.125

## 2019-08-19 ASSESSMENT — LID EXAM ASSESSMENTS: OD_TRICHIASIS: RLL 1+

## 2021-09-28 PROBLEM — H43.12 VITREOUS HEMORRHAGE; LEFT EYE: Status: ACTIVE | Noted: 2018-10-06

## 2021-09-28 PROBLEM — H43.812 POST VITREOUS DETACHMENT; LEFT EYE: Status: ACTIVE | Noted: 2018-10-06

## 2023-07-14 ENCOUNTER — APPOINTMENT (EMERGENCY)
Dept: RADIOLOGY | Facility: HOSPITAL | Age: 78
End: 2023-07-14
Payer: COMMERCIAL

## 2023-07-14 ENCOUNTER — HOSPITAL ENCOUNTER (EMERGENCY)
Facility: HOSPITAL | Age: 78
Discharge: HOME | End: 2023-07-14
Attending: EMERGENCY MEDICINE
Payer: COMMERCIAL

## 2023-07-14 VITALS
DIASTOLIC BLOOD PRESSURE: 72 MMHG | OXYGEN SATURATION: 97 % | WEIGHT: 139.77 LBS | BODY MASS INDEX: 21.94 KG/M2 | TEMPERATURE: 98.1 F | SYSTOLIC BLOOD PRESSURE: 107 MMHG | RESPIRATION RATE: 14 BRPM | HEART RATE: 68 BPM | HEIGHT: 67 IN

## 2023-07-14 DIAGNOSIS — I48.91 ATRIAL FIBRILLATION (HCC): Primary | ICD-10-CM

## 2023-07-14 LAB
2HR DELTA HS TROPONIN: 0 NG/L
ANION GAP SERPL CALCULATED.3IONS-SCNC: 7 MMOL/L
ATRIAL RATE: 276 BPM
ATRIAL RATE: 279 BPM
BASOPHILS # BLD AUTO: 0.05 THOUSANDS/ÂΜL (ref 0–0.1)
BASOPHILS NFR BLD AUTO: 1 % (ref 0–1)
BUN SERPL-MCNC: 20 MG/DL (ref 5–25)
CALCIUM SERPL-MCNC: 9.1 MG/DL (ref 8.4–10.2)
CARDIAC TROPONIN I PNL SERPL HS: 4 NG/L
CARDIAC TROPONIN I PNL SERPL HS: 4 NG/L
CHLORIDE SERPL-SCNC: 102 MMOL/L (ref 96–108)
CO2 SERPL-SCNC: 27 MMOL/L (ref 21–32)
CREAT SERPL-MCNC: 0.92 MG/DL (ref 0.6–1.3)
EOSINOPHIL # BLD AUTO: 0.11 THOUSAND/ÂΜL (ref 0–0.61)
EOSINOPHIL NFR BLD AUTO: 1 % (ref 0–6)
ERYTHROCYTE [DISTWIDTH] IN BLOOD BY AUTOMATED COUNT: 13.7 % (ref 11.6–15.1)
GFR SERPL CREATININE-BSD FRML MDRD: 78 ML/MIN/1.73SQ M
GLUCOSE SERPL-MCNC: 155 MG/DL (ref 65–140)
HCT VFR BLD AUTO: 48.2 % (ref 36.5–49.3)
HGB BLD-MCNC: 15.8 G/DL (ref 12–17)
IMM GRANULOCYTES # BLD AUTO: 0.04 THOUSAND/UL (ref 0–0.2)
IMM GRANULOCYTES NFR BLD AUTO: 1 % (ref 0–2)
LYMPHOCYTES # BLD AUTO: 1.17 THOUSANDS/ÂΜL (ref 0.6–4.47)
LYMPHOCYTES NFR BLD AUTO: 15 % (ref 14–44)
MCH RBC QN AUTO: 29.9 PG (ref 26.8–34.3)
MCHC RBC AUTO-ENTMCNC: 32.8 G/DL (ref 31.4–37.4)
MCV RBC AUTO: 91 FL (ref 82–98)
MONOCYTES # BLD AUTO: 0.61 THOUSAND/ÂΜL (ref 0.17–1.22)
MONOCYTES NFR BLD AUTO: 8 % (ref 4–12)
NEUTROPHILS # BLD AUTO: 5.7 THOUSANDS/ÂΜL (ref 1.85–7.62)
NEUTS SEG NFR BLD AUTO: 74 % (ref 43–75)
NRBC BLD AUTO-RTO: 0 /100 WBCS
P AXIS: 90 DEGREES
PLATELET # BLD AUTO: 229 THOUSANDS/UL (ref 149–390)
PMV BLD AUTO: 9.3 FL (ref 8.9–12.7)
POTASSIUM SERPL-SCNC: 3.9 MMOL/L (ref 3.5–5.3)
QRS AXIS: -80 DEGREES
QRS AXIS: -83 DEGREES
QRSD INTERVAL: 108 MS
QRSD INTERVAL: 112 MS
QT INTERVAL: 376 MS
QT INTERVAL: 410 MS
QTC INTERVAL: 439 MS
QTC INTERVAL: 467 MS
RBC # BLD AUTO: 5.28 MILLION/UL (ref 3.88–5.62)
SODIUM SERPL-SCNC: 136 MMOL/L (ref 135–147)
T WAVE AXIS: -56 DEGREES
T WAVE AXIS: -74 DEGREES
VENTRICULAR RATE: 69 BPM
VENTRICULAR RATE: 93 BPM
WBC # BLD AUTO: 7.68 THOUSAND/UL (ref 4.31–10.16)

## 2023-07-14 PROCEDURE — 80048 BASIC METABOLIC PNL TOTAL CA: CPT | Performed by: EMERGENCY MEDICINE

## 2023-07-14 PROCEDURE — 85025 COMPLETE CBC W/AUTO DIFF WBC: CPT | Performed by: EMERGENCY MEDICINE

## 2023-07-14 PROCEDURE — 93005 ELECTROCARDIOGRAM TRACING: CPT

## 2023-07-14 PROCEDURE — 71045 X-RAY EXAM CHEST 1 VIEW: CPT

## 2023-07-14 PROCEDURE — 36415 COLL VENOUS BLD VENIPUNCTURE: CPT | Performed by: EMERGENCY MEDICINE

## 2023-07-14 PROCEDURE — 84484 ASSAY OF TROPONIN QUANT: CPT | Performed by: EMERGENCY MEDICINE

## 2023-07-14 RX ORDER — METOPROLOL TARTRATE 50 MG/1
50 TABLET, FILM COATED ORAL 2 TIMES DAILY
Qty: 60 TABLET | Refills: 0 | Status: SHIPPED | OUTPATIENT
Start: 2023-07-14 | End: 2023-08-13

## 2023-07-14 RX ORDER — DILTIAZEM HYDROCHLORIDE 5 MG/ML
15 INJECTION INTRAVENOUS ONCE
Status: COMPLETED | OUTPATIENT
Start: 2023-07-14 | End: 2023-07-14

## 2023-07-14 RX ORDER — SODIUM CHLORIDE 9 MG/ML
3 INJECTION INTRAVENOUS
Status: DISCONTINUED | OUTPATIENT
Start: 2023-07-14 | End: 2023-07-14 | Stop reason: HOSPADM

## 2023-07-14 RX ADMIN — DILTIAZEM HYDROCHLORIDE 15 MG: 5 INJECTION INTRAVENOUS at 14:08

## 2023-07-14 NOTE — ED PROVIDER NOTES
History  Chief Complaint   Patient presents with   • Chest Pain     Pt reports having chest tightness that started suddenly one hour ago, states they had an MI a week ago while on vacation and had stent placed     78-year-old male presents to the ED for evaluation of chest tightness that began suddenly today. Patient had an MI at the end of June while he was on vacation with his family and received 1 stent as a result. Today he denies any shortness of breath. States that the chest tightness then moved to his upper back and feels sore and uncomfortable. It does not improve or worsen with ambulation or rest.  Family present with the patient states that he has a history of A-fib and currently is on Eliquis and metoprolol. None       Past Medical History:   Diagnosis Date   • Hyperlipidemia    • MI, acute, non ST segment elevation ([address])        Past Surgical History:   Procedure Laterality Date   • CORONARY ANGIOPLASTY WITH STENT PLACEMENT         History reviewed. No pertinent family history. I have reviewed and agree with the history as documented. E-Cigarette/Vaping   • E-Cigarette Use Never User      E-Cigarette/Vaping Substances     Social History     Tobacco Use   • Smoking status: Every Day     Packs/day: 0.25     Types: Cigarettes   • Smokeless tobacco: Never   Vaping Use   • Vaping Use: Never used   Substance Use Topics   • Alcohol use: Not Currently   • Drug use: Not Currently       Review of Systems   Constitutional: Negative for chills and fever. HENT: Negative for ear pain and sore throat. Eyes: Negative for pain and visual disturbance. Respiratory: Positive for chest tightness. Negative for cough and shortness of breath. Cardiovascular: Negative for chest pain, palpitations and leg swelling. Gastrointestinal: Negative for abdominal pain and vomiting. Genitourinary: Negative for dysuria and hematuria. Musculoskeletal: Negative for arthralgias and back pain.    Skin: Negative for color change and rash. Neurological: Negative for seizures and syncope. All other systems reviewed and are negative. Physical Exam  Physical Exam  Vitals and nursing note reviewed. Constitutional:       General: He is not in acute distress. Appearance: He is well-developed. HENT:      Head: Normocephalic and atraumatic. Eyes:      Extraocular Movements: Extraocular movements intact. Conjunctiva/sclera: Conjunctivae normal.   Cardiovascular:      Rate and Rhythm: Tachycardia present. Rhythm irregular. Heart sounds: Normal heart sounds. No murmur heard. Pulmonary:      Effort: Pulmonary effort is normal. No accessory muscle usage or respiratory distress. Breath sounds: Normal breath sounds. Chest:      Chest wall: No mass, deformity, tenderness, crepitus or edema. There is no dullness to percussion. Abdominal:      General: Bowel sounds are normal. There is no abdominal bruit. Palpations: Abdomen is soft. There is no fluid wave, hepatomegaly, splenomegaly or mass. Tenderness: There is no abdominal tenderness. There is no guarding or rebound. Musculoskeletal:         General: No swelling. Normal range of motion. Cervical back: Normal range of motion and neck supple. Right lower leg: No tenderness. No edema. Left lower leg: No tenderness. No edema. Skin:     General: Skin is warm and dry. Capillary Refill: Capillary refill takes less than 2 seconds. Neurological:      General: No focal deficit present. Mental Status: He is alert and oriented to person, place, and time.    Psychiatric:         Mood and Affect: Mood normal.         Behavior: Behavior normal.         Vital Signs  ED Triage Vitals [07/14/23 1301]   Temperature Pulse Respirations Blood Pressure SpO2   98.1 °F (36.7 °C) (!) 129 20 101/75 95 %      Temp Source Heart Rate Source Patient Position - Orthostatic VS BP Location FiO2 (%)   Temporal Monitor Sitting Right arm -- Pain Score       No Pain           Vitals:    07/14/23 1413 07/14/23 1445 07/14/23 1545 07/14/23 1615   BP: 110/77 96/69 97/67 107/72   Pulse: 69 68 68 68   Patient Position - Orthostatic VS:             Visual Acuity      ED Medications  Medications   sodium chloride (PF) 0.9 % injection 3 mL (has no administration in time range)   diltiazem (CARDIZEM) injection 15 mg (15 mg Intravenous Given 7/14/23 1408)       Diagnostic Studies  Results Reviewed     Procedure Component Value Units Date/Time    HS Troponin I 2hr [[id number]]  (Normal) Collected: 07/14/23 1506    Lab Status: Final result Specimen: Blood from Arm, Left Updated: 07/14/23 1538     hs TnI 2hr 4 ng/L      Delta 2hr hsTnI 0 ng/L     HS Troponin I 4hr [[id number]]     Lab Status: No result Specimen: Blood     HS Troponin 0hr (reflex protocol) [[id number]]  (Normal) Collected: 07/14/23 1320    Lab Status: Final result Specimen: Blood from Arm, Left Updated: 07/14/23 1348     hs TnI 0hr 4 ng/L     Basic metabolic panel [[id number]]  (Abnormal) Collected: 07/14/23 1320    Lab Status: Final result Specimen: Blood from Arm, Left Updated: 07/14/23 1344     Sodium 136 mmol/L      Potassium 3.9 mmol/L      Chloride 102 mmol/L      CO2 27 mmol/L      ANION GAP 7 mmol/L      BUN 20 mg/dL      Creatinine 0.92 mg/dL      Glucose 155 mg/dL      Calcium 9.1 mg/dL      eGFR 78 ml/min/1.73sq m     Narrative:      [place] guidelines for Chronic Kidney Disease (CKD):   •  Stage 1 with normal or high GFR (GFR > 90 mL/min/1.73 square meters)  •  Stage 2 Mild CKD (GFR = 60-89 mL/min/1.73 square meters)  •  Stage 3A Moderate CKD (GFR = 45-59 mL/min/1.73 square meters)  •  Stage 3B Moderate CKD (GFR = 30-44 mL/min/1.73 square meters)  •  Stage 4 Severe CKD (GFR = 15-29 mL/min/1.73 square meters)  •  Stage 5 End Stage CKD (GFR <15 mL/min/1.73 square meters)  Note: GFR calculation is accurate only with a steady state creatinine    CBC and differential [[id number]] Collected: 07/14/23 1320    Lab Status: Final result Specimen: Blood from Arm, Left Updated: 07/14/23 1326     WBC 7.68 Thousand/uL      RBC 5.28 Million/uL      Hemoglobin 15.8 g/dL      Hematocrit 48.2 %      MCV 91 fL      MCH 29.9 pg      MCHC 32.8 g/dL      RDW 13.7 %      MPV 9.3 fL      Platelets 925 Thousands/uL      nRBC 0 /100 WBCs      Neutrophils Relative 74 %      Immat GRANS % 1 %      Lymphocytes Relative 15 %      Monocytes Relative 8 %      Eosinophils Relative 1 %      Basophils Relative 1 %      Neutrophils Absolute 5.70 Thousands/µL      Immature Grans Absolute 0.04 Thousand/uL      Lymphocytes Absolute 1.17 Thousands/µL      Monocytes Absolute 0.61 Thousand/µL      Eosinophils Absolute 0.11 Thousand/µL      Basophils Absolute 0.05 Thousands/µL                  X-ray chest 1 view portable   Final Result by Jagjit Amanda MD (07/14 1634)      No acute cardiopulmonary disease. Workstation performed: [id number]                    Procedures  Procedures         ED Course  ED Course as of 07/14/23 1644   Fri Jul 14, 2023   1632 Case was discussed with the cardiologist. Patient HR improved and patient will be dc home with higher dose of metoprolol. Patient currently takes metoprolol 25 mg once daily and now will take it twice a day. He states he has enough medication and does not need a prescription. The troponin is negative times 2. SBIRT 22yo+    Flowsheet Row Most Recent Value   Initial Alcohol Screen: US AUDIT-C     1. How often do you have a drink containing alcohol? 0 Filed at: 07/14/2023 1259   2. How many drinks containing alcohol do you have on a typical day you are drinking? 0 Filed at: 07/14/2023 1259   3b. FEMALE Any Age, or MALE 65+: How often do you have 4 or more drinks on one occassion? 0 Filed at: 07/14/2023 1259   Audit-C Score 0 Filed at: 07/14/2023 1259   JOJO: How many times in the past year have you. ..     Used an illegal drug or used a prescription medication for non-medical reasons? Never Filed at: 07/14/2023 8469                    Medical Decision Making  DDx: A-fib with RVR, arrhythmia, MI, ACS    Amount and/or Complexity of Data Reviewed  Labs: ordered. Radiology: ordered. Risk  Prescription drug management. Disposition  Final diagnoses:   Atrial fibrillation ([address])     Time reflects when diagnosis was documented in both MDM as applicable and the Disposition within this note     Time User Action Codes Description Comment    7/14/2023  4:33 PM Jai Park [I48.91] Atrial fibrillation [facility])       ED Disposition     ED Disposition   Discharge    Condition   Stable    Date/Time   Fri Jul 14, 2023  4:33 PM    Comment   Chaparro Thomas discharge to home/self care. Follow-up Information     Follow up With Specialties Details Why [address], DO Cardiology Schedule an appointment as soon as possible for a visit in 1 week  [address] [facility]  [phone]            Patient's Medications    No medications on file       No discharge procedures on file.     PDMP Review     None          ED Provider  Electronically Signed by           Kt Geiger DO  07/14/23 5189

## 2023-07-14 NOTE — DISCHARGE INSTRUCTIONS
Return to the ER immediately for any worsening symptoms. Please follow up with your doctor for further evaluation and management.

## 2023-07-20 ENCOUNTER — APPOINTMENT (EMERGENCY)
Dept: CT IMAGING | Facility: HOSPITAL | Age: 78
End: 2023-07-20
Payer: OTHER GOVERNMENT

## 2023-07-20 ENCOUNTER — HOSPITAL ENCOUNTER (EMERGENCY)
Facility: HOSPITAL | Age: 78
Discharge: ANOTHER HOSPITAL | End: 2023-07-20
Attending: EMERGENCY MEDICINE | Admitting: EMERGENCY MEDICINE
Payer: OTHER GOVERNMENT

## 2023-07-20 VITALS
WEIGHT: 139.6 LBS | TEMPERATURE: 97.5 F | HEIGHT: 67 IN | BODY MASS INDEX: 21.91 KG/M2 | OXYGEN SATURATION: 98 % | SYSTOLIC BLOOD PRESSURE: 151 MMHG | RESPIRATION RATE: 18 BRPM | DIASTOLIC BLOOD PRESSURE: 92 MMHG | HEART RATE: 84 BPM

## 2023-07-20 DIAGNOSIS — S01.01XA LACERATION OF SCALP, INITIAL ENCOUNTER: ICD-10-CM

## 2023-07-20 DIAGNOSIS — S06.5XAA SUBDURAL HEMATOMA (HCC): ICD-10-CM
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
## 2023-07-20 PROCEDURE — 70450 CT HEAD/BRAIN W/O DYE: CPT

## 2023-07-20 PROCEDURE — 90715 TDAP VACCINE 7 YRS/> IM: CPT | Performed by: EMERGENCY MEDICINE

## 2023-07-20 PROCEDURE — 72125 CT NECK SPINE W/O DYE: CPT

## 2023-07-20 RX ORDER — GINSENG 100 MG
1 CAPSULE ORAL ONCE
Status: COMPLETED | OUTPATIENT
Start: 2023-07-20 | End: 2023-07-20

## 2023-07-20 RX ORDER — FENTANYL CITRATE 50 UG/ML
25 INJECTION, SOLUTION INTRAMUSCULAR; INTRAVENOUS ONCE
Status: COMPLETED | OUTPATIENT
Start: 2023-07-20 | End: 2023-07-20

## 2023-07-20 RX ADMIN — Medication 2000 UNITS: at 14:31

## 2023-07-20 RX ADMIN — BACITRACIN 1 SMALL APPLICATION: 500 OINTMENT TOPICAL at 13:51

## 2023-07-20 RX ADMIN — TETANUS TOXOID, REDUCED DIPHTHERIA TOXOID AND ACELLULAR PERTUSSIS VACCINE, ADSORBED 0.5 ML: 5; 2.5; 8; 8; 2.5 SUSPENSION INTRAMUSCULAR at 13:51

## 2023-07-20 RX ADMIN — FENTANYL CITRATE 25 MCG: 0.05 INJECTION, SOLUTION INTRAMUSCULAR; INTRAVENOUS at 15:09

## 2023-07-20 NOTE — ED PROVIDER NOTES
Emergency Department Trauma Note  Chaparro Guerrero 78 y.o. male MRN: [id number]  Unit/Bed#: ED 07/ED 07 Encounter: [id number]      Trauma Alert: Trauma Acuity: Trauma Evaluation  Model of Arrival:   via    Trauma Team: Current Providers  Attending Provider: Sony May DO  Registered Nurse: Rkeha Kendrick RN  Consultants:     None      History of Present Illness     Chief Complaint:   Chief Complaint   Patient presents with   • Fall     Pt fell backwards off of a mobility scooter hitting his head on a piece of metal. +neck pain, +head lac, +BT -LOC. Trauma eval called at 1316     HPI:  Chaparro Coker is a 78 y.o. male who presents with . Mechanism:           22-year-old male presents emergency room with a head injury. Patient was using his mobility scooter to go a lawnmower which caused the mobility scooter to flip backwards. Patient landed on the ground possibly striking a piece of metal.  No loss of conscious. No change in vision. Minimal neck tenderness. Denies other injury. He was ambulatory after the accident. Was slightly nauseated. That has resolved. History provided by:  Patient and relative  Fall  Mechanism of injury: fall    Injury location:  Head/neck  Head/neck injury location:  Head  Incident location:  Home  Fall:     Fall occurred: Mobility scooter.     Impact surface: Metal.  Suspicion of alcohol use: no    Suspicion of drug use: no    Tetanus status:  Unknown  Prior to arrival data:     Blood loss:  Minimal    Responsiveness at scene:  Alert    Orientation at scene:  Person, place, situation and time    Loss of consciousness: no      Amnesic to event: no      Airway interventions:  None    Medications administered:  None    Immobilization:  C-collar    Airway condition since incident:  Stable    Breathing condition since incident:  Stable    Circulation condition since incident:  Stable    Mental status condition since incident:  Stable    Disability condition since incident: Stable  Associated symptoms: neck pain    Associated symptoms: no abdominal pain, no back pain, no blindness, no chest pain, no headaches, no hearing loss, no nausea, no seizures and no vomiting    Risk factors: anticoagulation therapy and CAD      Review of Systems   Constitutional: Negative. Negative for activity change, diaphoresis and fever. HENT: Negative. Negative for ear pain, facial swelling, hearing loss and trouble swallowing. Eyes: Negative. Negative for blindness, pain and visual disturbance. Respiratory: Negative. Negative for chest tightness, shortness of breath and wheezing. Cardiovascular: Negative. Negative for chest pain and palpitations. Gastrointestinal: Negative. Negative for abdominal distention, abdominal pain, constipation, diarrhea, nausea and vomiting. Genitourinary: Negative. Musculoskeletal: Positive for neck pain. Negative for arthralgias, back pain, joint swelling and myalgias. Skin: Negative. Negative for wound. Neurological: Negative. Negative for dizziness, seizures, light-headedness, numbness and headaches. Hematological: Negative. Does not bruise/bleed easily. Psychiatric/Behavioral: Negative. All other systems reviewed and are negative. Historical Information     Immunizations:   Immunization History   Administered Date(s) Administered   • Tdap 07/20/2023       Past Medical History:   Diagnosis Date   • Hyperlipidemia    • MI, acute, non ST segment elevation ([address])      History reviewed. No pertinent family history.   Past Surgical History:   Procedure Laterality Date   • CORONARY ANGIOPLASTY WITH STENT PLACEMENT       Social History     Tobacco Use   • Smoking status: Every Day     Packs/day: 0.25     Types: Cigarettes   • Smokeless tobacco: Never   Vaping Use   • Vaping Use: Never used   Substance Use Topics   • Alcohol use: Not Currently   • Drug use: Not Currently     E-Cigarette/Vaping   • E-Cigarette Use Never User E-Cigarette/Vaping Substances       Family History: non-contributory    Meds/Allergies   Prior to Admission Medications   Prescriptions Last Dose Informant Patient Reported? Taking?   metoprolol tartrate (LOPRESSOR) 50 mg tablet   No No   Sig: Take 1 tablet (50 mg total) by mouth 2 (two) times a day      Facility-Administered Medications: None       No Known Allergies    PHYSICAL EXAM      Objective   Vitals:   First set: Temperature: 97.5 °F (36.4 °C) (07/20/23 1320)  Pulse: (!) 114 (07/20/23 1318)  Respirations: 17 (07/20/23 1318)  Blood Pressure: (!) 143/102 (07/20/23 1318)  SpO2: 95 % (07/20/23 1318)    Primary Survey:   (A) Airway: Normal  (B) Breathing: Normal  (C) Circulation: Pulses:   normal  (D) Disabliity:  GCS Total:  15  (E) Expose:  Completed    Secondary Survey: (Click on Physical Exam tab above)  Physical Exam  Vitals and nursing note reviewed. Constitutional:       General: He is not in acute distress. Appearance: Normal appearance. He is well-developed and normal weight. He is not ill-appearing or toxic-appearing. HENT:      Head: Normocephalic. Abrasion and contusion present. Hair is normal.      Jaw: No pain on movement. Right Ear: External ear normal.      Left Ear: External ear normal.      Nose: Nose normal. No congestion. Mouth/Throat:      Mouth: Mucous membranes are moist.   Eyes:      General: Lids are normal. No scleral icterus. Extraocular Movements: Extraocular movements intact. Conjunctiva/sclera: Conjunctivae normal.      Pupils: Pupils are equal, round, and reactive to light. Cardiovascular:      Rate and Rhythm: Normal rate and regular rhythm. Heart sounds: Normal heart sounds. No murmur heard. Pulmonary:      Effort: Pulmonary effort is normal. No respiratory distress. Breath sounds: Normal breath sounds. No decreased breath sounds, wheezing, rhonchi or rales. Abdominal:      General: Abdomen is flat. There is no distension. Palpations: Abdomen is soft. Abdomen is not rigid. Tenderness: There is no abdominal tenderness. There is no guarding or rebound. Musculoskeletal:         General: No swelling, deformity or signs of injury. Normal range of motion. Cervical back: Normal range of motion and neck supple. Tenderness present. Thoracic back: Normal.      Lumbar back: Normal.        Back:    Skin:     General: Skin is warm and dry. Coloration: Skin is not pale. Findings: No rash. Neurological:      General: No focal deficit present. Mental Status: He is alert and oriented to person, place, and time. Mental status is at baseline. Psychiatric:         Attention and Perception: Attention normal.         Mood and Affect: Mood normal.         Speech: Speech normal.         Behavior: Behavior normal.         Thought Content: Thought content normal.         Judgment: Judgment normal.         Cervical spine cleared by clinical criteria? No (imaging required)      Invasive Devices     Peripheral Intravenous Line  Duration           Peripheral IV 07/20/23 Left Antecubital <1 day                Lab Results:   Results Reviewed     None                 Imaging Studies:   Direct to CT: Yes  TRAUMA - CT head wo contrast   Final Result by Parth Bailey MD (07/20 9220)   Tiny hyperdensity along the left parasagittal sinus on series 2 image 31 concerning for tiny subdural hemorrhage without edema, mass effect or shift. Recommend 4 to 6-hour follow-up CT to ensure stability. I personally discussed this study with Ken Solares on 7/20/2023 1:52 PM.               Workstation performed: [id number]         TRAUMA - CT spine cervical wo contrast   Final Result by Parth Bailey MD (07/20 8725)      No cervical spine fracture or traumatic malalignment.                   Workstation performed: [id number]               Procedures  Procedures         ED Course  ED Course as of 07/20/23 1420   Thu Jul 20, 2023   1400 Mild subdural.  CT of the neck is negative. 1400 Discussed the need for transfer with patient. Patient would prefer [facility]. 1417 4 cm scalp wound closed as described. This was discussed with Steven Tafoya at [facility]. Patient was excepted to the emergency department as a trauma alert. Berniece Faden was ordered. Medical Decision Making  Patient presented to the emergency department and a MSE was performed. The patient was evaluated for complaint related to acute traumatic injury. Patient is potentially at risk for, but not limited to, acute head injury including intracranial hemorrhage, subdural or epidural hematoma, cervical spine injury, other spine injury, or other musculoskeletal injury. Several of these diagnoses have been evaluated and ruled out by history and physical.  As needed, patient will be further evaluated with laboratory and imaging studies. Higher level diagnostics, such as CT imaging or ultrasound, may also be required. Please see work-up portion of the note for further evaluation of patient's risk. Socioeconomic factors were also considered as part of the decision-making process. Unless otherwise stated in the chart or patient is admitted as elsewhere documented, any previously prescribed medications will be maintained. CXR was not obtained as this was isolated head injury  Pelvic plain film was not obtained as this was isolated head injury and patient was ambulatory and there was no pelvic tenderness    Patient deemed stable to proceed to any necessary CT imaging. Cervical spine was cleared clinically and with imaging      Fall, initial encounter: acute illness or injury  Laceration of scalp, initial encounter: acute illness or injury  Subdural hematoma ([address]): complicated acute illness or injury with systemic symptoms  Amount and/or Complexity of Data Reviewed  Radiology: ordered. Risk  OTC drugs. Prescription drug management.     Risk Details: Patient presented to the emergency department and a MSE was performed. The patient was evaluated and diagnosed with acute subdural hematoma status post fall. This is a new issue that will require additional planned work-up and treatment in a hospitalized setting. As may have been required as part of this evaluation, clinical laboratory test, radiology imaging and medical testing (I.e. EKG) were ordered as necessitated by the patient's presentation. I independently reviewed these studies, imaging and testing. This patient's case is considered to be a considerable risk secondary to the above listed disease process and poses a threat to the patient's well-being and baseline function. Further in-patient diagnostic testing and management, which may include the administration of parenteral medications, is required. Pt required transfer to [facility] secondary to the need for trauma and neurosurgical services. Disposition  [facility] Transfer: No  Final diagnoses:   Fall, initial encounter   Subdural hematoma ([address])   Laceration of scalp, initial encounter     Time reflects when diagnosis was documented in both MDM as applicable and the Disposition within this note     Time User Action Codes Description Comment    7/20/2023  2:02 PM Yusuf Vanegas Add [R39. JRWF] Fall, initial encounter     7/20/2023  2:03 PM Yusuf Vanegas Add [S06. 5XAA] Subdural hematoma ([address])     7/20/2023  2:03 PM Dylan Wilson Add [S01.01XA] Laceration of scalp, initial encounter       ED Disposition     ED Disposition   Transfer to Another [address] [place] [address] Ave of Network    Condition   --    Date/Time   Thu Jul 20, 2023  2:01 PM    Comment   Chaparro Zhu should be transferred out to trauma transfer to [facility].            MD Documentation    Flowsheet Row Most Recent Value   Patient Condition The patient has been stabilized such that within reasonable medical probability, no material deterioration of the patient condition or the condition of the unborn child(sharon) is likely to result from the transfer   Reason for Transfer Level of Care needed not available at this facility  [Trauma and neurosurgical services]   Benefits of Transfer Specialized equipment and/or services available at the receiving facility (Include comment)________________________  Ozella Clarke and neurosurgical services]   Risks of Transfer Potential for delay in receiving treatment, Potential deterioration of medical condition, Loss of IV, Increased discomfort during transfer   Accepting Physician Dr. Gibson Rater Name, [address] [place] [address]    (Name & Tel number) Steph Billy   Provider Certification General risk, such as traffic hazards, adverse weather conditions, rough terrain or turbulence, possible failure of equipment (including vehicle or aircraft), or consequences of actions of persons outside the control of the transport personnel, Unanticipated needs of medical equipment and personnel during transport, Risk of worsening condition, The possibility of a transport vehicle being unavailable      RN Documentation    Flowsheet Row Most [address] [facility] [place] & [facility] (Name & Tel number) Steph Lundberg      Follow-up Information    None       Patient's Medications   Discharge Prescriptions    No medications on file     No discharge procedures on file.     PDMP Review     None          ED Provider  Electronically Signed by         Sony May DO  07/20/23 6327

## 2023-07-25 ENCOUNTER — TELEPHONE (OUTPATIENT)
Dept: NEUROSURGERY | Facility: CLINIC | Age: 78
End: 2023-07-25